# Patient Record
Sex: FEMALE | Race: OTHER | Employment: UNEMPLOYED | URBAN - NONMETROPOLITAN AREA
[De-identification: names, ages, dates, MRNs, and addresses within clinical notes are randomized per-mention and may not be internally consistent; named-entity substitution may affect disease eponyms.]

---

## 2017-03-27 ENCOUNTER — OFFICE VISIT (OUTPATIENT)
Dept: FAMILY MEDICINE CLINIC | Age: 37
End: 2017-03-27

## 2017-03-27 VITALS
WEIGHT: 173 LBS | SYSTOLIC BLOOD PRESSURE: 137 MMHG | DIASTOLIC BLOOD PRESSURE: 92 MMHG | HEIGHT: 64 IN | BODY MASS INDEX: 29.53 KG/M2 | HEART RATE: 73 BPM | TEMPERATURE: 98.6 F

## 2017-03-27 DIAGNOSIS — N64.4 BREAST PAIN IN FEMALE: ICD-10-CM

## 2017-03-27 DIAGNOSIS — Z13.9 SCREENING: Primary | ICD-10-CM

## 2017-03-27 LAB — GLUCOSE POC: NORMAL MG/DL

## 2017-03-27 RX ORDER — IBUPROFEN 600 MG/1
600 TABLET ORAL
Qty: 60 TAB | Refills: 1 | Status: SHIPPED | OUTPATIENT
Start: 2017-03-27

## 2017-03-27 NOTE — PROGRESS NOTES
Coordination of Care  1. Have you been to the ER, urgent care clinic since your last visit? Hospitalized since your last visit? No    2. Have you seen or consulted any other health care providers outside of the [address] [place] [address] since your last visit? Include any pap smears or colon screening.  No    Medications  Medication Reconciliation Performed: no  Patient does not need refills     Learning Assessment Complete? yes  Results for orders placed or performed in visit on 03/27/17   AMB POC GLUCOSE BLOOD, BY GLUCOSE MONITORING DEVICE   Result Value Ref Range    Glucose POC 85 fasting mg/dL

## 2017-03-27 NOTE — MR AVS SNAPSHOT
Visit Information [facility] [place] Personal Médico Departamento Teléfono del Dep. Número de visita 3/27/2017 10:30 AM [facility] [phone] Follow-up Instructions Return in about 2 months (around 5/27/2017), or if symptoms worsen or fail to improve. Upcoming Health Maintenance Date Due DTaP/Tdap/Td series (1 - Tdap) 2/24/1999 PAP AKA CERVICAL CYTOLOGY 2/24/1999 INFLUENZA AGE 9 TO ADULT 8/1/2016 Alergias  Review Complete El: 3/27/2017 Por: Kathleen Fulling A partir del:  3/27/2017 No Known Allergies Vacunas actuales Maki Helene No hay ninguna vacuna archivada. No revisadas esta visita You Were Diagnosed With   
  
 Charmayne Decent Screening    -  Primary ICD-10-CM: Z13.9 ICD-9-CM: V82.9 Breast pain in female     ICD-10-CM: N64.4 ICD-9-CM: 611.71 Partes vitales PS Pulso Temperatura [place] ( percentil de crecimiento) Peso (percentil de crecimiento) LMP (última deepti) (!) 137/92 (BP 1 Location: Left arm, BP Patient Position: Sitting) 73 98.6 °F (37 °C) (Oral) 5' 4.25\" (1.632 m) 173 lb (78.5 kg) 03/17/2017 BMI [facility]) Estado obstétrico Estatus de tabaquísmo 29.46 kg/m2 Having regular periods Never Smoker Historial de signos vitales BMI and BSA Data Body Mass Index Body Surface Area  
 29.46 kg/m 2 1.89 m 2 [facility] Name Phone [facility]  [phone] Quinteros lista de medicamentos actualizada Lista actualizada el: 3/27/17 11:06 AM.  Shawn Olsen use quinteros lista de medicamentos más reciente. ibuprofen 600 mg tablet También conocido ronan:  MOTRIN Take 1 Tab by mouth every six (6) hours as needed for Pain. Sadorus 1 pastilla cada 6 horas si necissita por quinteros dolor Recetas Enviado a la Hall Refills  
 ibuprofen (MOTRIN) 600 mg tablet 1 Sig: Take 1 Tab by mouth every six (6) hours as needed for Pain. [place] 1 pastilla cada 6 horas si necissita por diaz dolor Class: Normal  
 [id number] [address] [place] [address] #: [phone] Route: Oral  
  
Hicimos lo siguiente AMB POC GLUCOSE BLOOD, BY GLUCOSE MONITORING DEVICE [63257 CPT(R)] Instrucciones de seguimiento Return in about 2 months (around 5/27/2017), or if symptoms worsen or fail to improve. Instrucciones para el Paciente Dolor de senos: Instrucciones de cuidado - [ Breast Pain: Care Instructions ] Instrucciones de cuidado La sensibilidad y el dolor de senos podría aparecer y desaparecer con los periodos menstruales (cíclico) o podría no seguir ningún patrón (no cíclico). El dolor de senos obdulia vez es causado por un problema de nuris grave. Podría ser necesario hacer pruebas para averiguar la causa. La atención de seguimiento es ne parte clave de diaz tratamiento y seguridad. Asegúrese de hacer y acudir a todas las citas, y llame a diaz médico si está teniendo problemas. También es ne buena idea saber los resultados de los exámenes y mantener ne lista de los medicamentos que ayush. Cómo puede cuidarse en el hogar? · Si diaz médico le recetó un medicamento, tómelo exactamente según las indicaciones. Llame a diaz médico si brayan estar teniendo problemas con diaz medicamento. · Para aliviar el dolor y la hinchazón, tome un analgésico (medicamento para el dolor) de venta jackeline, ronan acetaminofén (Tylenol), ibuprofeno (Advil, Motrin) o naproxeno (Aleve). Amber y siga todas las instrucciones de la [place]. · No tome dos o más analgésicos al [employer], a menos que el médico se lo haya indicado. Muchos analgésicos contienen acetaminofén, es decir, Tylenol. El exceso de acetaminofén (Tylenol) puede ser dañino. · Use un sostén que le dé buen soporte, ronan los que se usan para hacer deporte o correr. · Reduzca la cantidad de grasa en diaz dieta. Si necesita ayuda para planificar comidas saludables, consulte a un dietista. · Navi por lo menos 30 minutos de ejercicio la mayoría de los días de la [place]. Caminar es ne buena opción. Es posible que también quiera hacer otras actividades, ronan correr, nadar, [employer], o jugar al tenis o practicar otros deportes de equipo. · Mantenga un patrón de sueño saludable. Adelaida Rota a dormir a la misma hora todas las noches y levántese a la misma hora cada día. Cuándo debe pedir ayuda? Llame a diaz médico ahora mismo o busque atención médica inmediata si: · Tiene cambios nuevos en un seno, ronan: ¨ Un bulto o engrosamiento en el seno o la axila. ¨ Un cambio en el tamaño o la forma del seno. ¨ Cambios en la piel, ronan hoyuelos o arrugas. ¨ Secreción de [employer]. ¨ Un cambio en el color o la textura de la piel del seno o la suhas oscura alrededor del pezón (areola). ¨ Un cambio en la forma del pezón (podría parecer ronan si estuviera hundido en el seno). · Tiene síntomas de infección en el seno, tales ronan: ¨ Aumento del dolor, la hinchazón, el enrojecimiento o la temperatura alrededor del seno. ¨ Vetas rojizas que se extienden desde el seno. ¨ Pus que supura de un seno. Guadlupe Forester. Preste especial atención a los cambios en diaz nuris y asegúrese de comunicarse con diaz médico si: 
· Diaz dolor de los senos no disminuye después de 1 semana. · Tiene un bulto o engrosamiento en el seno o la axila. Dónde puede encontrar más información en inglés? Adelaida Holder a [email or web address] Elas Altman [id number] en la búsqueda para aprender más acerca de \"Dolor de senos: Instrucciones de cuidado - [ Breast Pain: Care Instructions ]. \" 
Revisado: [address] [place], 2016 Versión del contenido: 11.1 © 6915-8823 [employer]orporated.  Las instrucciones de cuidado fueron adaptadas bajo licencia por Good Help Connections (which disclaims liability or warranty for this information). Si usted tiene [place] afección médica o sobre estas instrucciones, siempre pregunte a diaz profesional de nuris. [facility], Incorporated niega toda garantía o responsabilidad por diaz uso de esta información. Introducing [facility]! [facility] introduce portal paciente MyChart . Ahora se puede acceder a partes de diaz expediente médico, enviar por correo electrónico la oficina de diaz médico y solicitar renovaciones de medicamentos en línea. En diaz navegador de Internet , Dale Nelson a [email or web address] Shantell clic en el usuario por Theadore Ngozi? Clair Salmeron clic aquí en la sesión Elmaurizioa Мария. Verá la página de registro [place]. Ingrese diaz código de [employer] kolby y ronan aparece a continuación. Usted no tendrá que [employer] código después de servando completado el proceso de registro . Si usted no se inscribe antes de la fecha de caducidad , debe solicitar un nuevo código. · MyChart Código de acceso : [id number] Expires: 6/25/2017 11:06 AM 
 
Ingresa los últimos cuatro dígitos de diaz Número de Seguro Social ( xxxx ) y fecha de nacimiento ( dd / mm / aaaa ) ronan se indica y shantell clic en Enviar. Christel será llevado a la siguiente página de registro . Crear un ID MyChart . Esta será diaz ID de inicio de sesión de MyChart y no puede ser [place] , por lo que pensar en ne que es Valentine Hansen y fácil de recordar . Crear ne contraseña MyChart . ted puede cambiar diaz contraseña en cualquier momento . Ingrese diaz Password Reset de preguntas y Abdi . [place] se puede utilizar en un momento posterior si usted olvida diaz contraseña. Introduzca diaz dirección de correo electrónico . Thurl Wayne recibirá ne notificación por correo electrónico cuando la nueva información está disponible en MyChart . [place] don en Registrarse.  Camillo Smiles frankie y descargar porciones de diaz expediente Radha don en el enlace de descarga del menú Resumen para descargar ne copia portátil de diaz información médica . Si tiene [employer] , por favor visite la sección de preguntas frecuentes del sitio web MyChart . Recuerde, MyChart NO es que se utilizará para las necesidades urgentes. Para emergencias médicas , llame al 911 . Ahora disponible en diaz iPhone y Android ! Por favor proporcione mitchell resumen de la documentación de cuidado a diaz próximo proveedor. Your primary care clinician is listed as Phys Other. If you have any questions after today's visit, please call [phone].

## 2017-03-27 NOTE — PATIENT INSTRUCTIONS
Dolor de senos: Instrucciones de cuidado - [ Breast Pain: Care Instructions ]  Instrucciones de cuidado  La sensibilidad y el dolor de senos podría aparecer y desaparecer con los periodos menstruales (cíclico) o podría no seguir ningún patrón (no cíclico). El dolor de senos obdulia vez es causado por un problema de nuris grave. Podría ser necesario hacer pruebas para averiguar la causa. La atención de seguimiento es ne parte clave de diaz tratamiento y seguridad. Asegúrese de hacer y acudir a todas las citas, y llame a diaz médico si está teniendo problemas. También es ne buena idea saber los resultados de los exámenes y mantener ne lista de los medicamentos que ayush. ¿Cómo puede cuidarse en el hogar? · Si diaz médico le recetó un medicamento, tómelo exactamente según las indicaciones. Llame a diaz médico si brayan estar teniendo problemas con diaz medicamento. · Para aliviar el dolor y la hinchazón, tome un analgésico (medicamento para el dolor) de venta jackeline, ronan acetaminofén (Tylenol), ibuprofeno (Advil, Motrin) o naproxeno (Aleve). Amber y siga todas las instrucciones de la [place]. · No tome dos o más analgésicos al [employer], a menos que el médico se lo haya indicado. Muchos analgésicos contienen acetaminofén, es decir, Tylenol. El exceso de acetaminofén (Tylenol) puede ser dañino. · Use un sostén que le dé buen soporte, ronan los que se usan para hacer deporte o correr. · Reduzca la cantidad de grasa en diaz dieta. Si necesita ayuda para planificar comidas saludables, consulte a un dietista. · Navi por lo menos 30 minutos de ejercicio la mayoría de los días de la [place]. Caminar es ne buena opción. Es posible que también quiera hacer otras actividades, ronan correr, nadar, [employer], o jugar al tenis o practicar otros deportes de equipo. · Mantenga un patrón de sueño saludable. Penne Delcambre a dormir a la misma hora todas las noches y levántese a la misma hora cada día. ¿Cuándo debe pedir ayuda?   Llame a quinteros médico ahora mismo o busque atención médica inmediata si:  · Tiene cambios nuevos en un seno, ronan:  ¨ Un bulto o engrosamiento en el seno o la axila. ¨ Un cambio en el tamaño o la forma del seno. ¨ Cambios en la piel, ronan hoyuelos o arrugas. ¨ Secreción de [employer]. ¨ Un cambio en el color o la textura de la piel del seno o la suhas oscura alrededor del pezón (areola). ¨ Un cambio en la forma del pezón (podría parecer ronan si estuviera hundido en el seno). · Tiene síntomas de infección en el seno, tales ronan:  ¨ Aumento del dolor, la hinchazón, el enrojecimiento o la temperatura alrededor del seno. ¨ Vetas rojizas que se extienden desde el seno. ¨ Pus que supura de un seno. Aftab Knox. Preste especial atención a los cambios en quinteros nuris y asegúrese de comunicarse con quinteros médico si:  · Quinteros dolor de los senos no disminuye después de 1 semana. · Tiene un bulto o engrosamiento en el seno o la axila. ¿Dónde puede encontrar más información en inglés? Jose Alfredo Sultana a [email or web address] Melani Hayley [id number] en la búsqueda para aprender más acerca de \"Dolor de senos: Instrucciones de cuidado - [ Breast Pain: Care Instructions ]. \"  Revisado: [address] [place], 2016  Versión del contenido: 11.1  © 9583-7879 [employer], Incorporated. Las instrucciones de cuidado fueron adaptadas bajo licencia por Good Help Connections (which disclaims liability or warranty for this information). Si usted tiene [place] afección médica o sobre estas instrucciones, siempre pregunte a quinteros profesional de nuris. [facility], Incorporated niega toda garantía o responsabilidad por quinteros uso de esta información.

## 2017-03-28 NOTE — PROGRESS NOTES
Assessment/Plan:    Radha Wayne was seen today for breast pain and other. Diagnoses and all orders for this visit:    Screening  -     AMB POC GLUCOSE BLOOD, BY GLUCOSE MONITORING DEVICE    Breast pain in female  -     ibuprofen (MOTRIN) 600 mg tablet; Take 1 Tab by mouth every six (6) hours as needed for Pain. [place] 1 pastilla cada 6 horas si necissita por diaz dolor    Suspect fibrocystic breast changes which coincided exactly with the removal of her implanon. Asked her to try ibu pre-menstrual period for the next 2 months and return in 2 months. Will get mammogram if sxs are not improved or have changed. She will be due for mammo within the year as she will turn 38    Follow-up Disposition:  Return in about 2 months (around 5/27/2017), or if symptoms worsen or fail to improve. EMELYN Aiken expressed understanding of this plan. An AVS was printed and given to the patient.      ----------------------------------------------------------------------    Chief Complaint   Patient presents with    Breast pain     pt c/o a stabbing pain on both breasts but worse on left side x2 months, family hx of diabetes and cancer    Other     pt requesting labs for cholesterol and A1C       History of Present Illness:  Pt here for new problem with occasional breast pain in left breast. She notes that she first noticed the pain 2 months ago. The timing coincided with the removal of her Implanon for birth control. Since then, she is using condoms for birth control- she is not desiring a future pregnancy at this time. The breast pain seemed to be worse before her period. She has had this before and had a mammogram about 4-5 years ago, which was reported to her as normal findings. She is not aware if there were cystic changes in her breasts at that time. While on the implanon plan for birth control, she did not have this problem.  She thinks that at times before her period that she can feel a \"hard part of her breast\" but currently she can not feel any thing. The results of her mammogram are not available at this time      Past Medical History:   Diagnosis Date    Pap smear for cervical cancer screening 08/ 2012    normal       Current Outpatient Prescriptions   Medication Sig Dispense Refill    ibuprofen (MOTRIN) 600 mg tablet Take 1 Tab by mouth every six (6) hours as needed for Pain. [place] 1 pastilla cada 6 horas si necissita por diaz dolor 60 Tab 1       No Known Allergies    Social History   Substance Use Topics    Smoking status: Never Smoker    Smokeless tobacco: None    Alcohol use Yes      Comment: social       No family history on file.     Physical Exam:     Visit Vitals    BP (!) 137/92 (BP 1 Location: Left arm, BP Patient Position: Sitting)    Pulse 73    Temp 98.6 °F (37 °C) (Oral)    Ht 5' 4.25\" (1.632 m)    Wt 173 lb (78.5 kg)    LMP 03/17/2017    BMI 29.46 kg/m2     gen looks well, pleasant  A&Ox3  WDWN NAD  Respirations normal and non labored  Breast exam- symmetrical aracelis, no masses, nipple retraction, dimpling, skin color changes or masses felt

## 2017-05-09 ENCOUNTER — HOSPITAL ENCOUNTER (OUTPATIENT)
Dept: CT IMAGING | Age: 37
Discharge: HOME | End: 2017-05-09
Attending: FAMILY MEDICINE
Payer: SELF-PAY

## 2017-05-09 ENCOUNTER — OFFICE VISIT (OUTPATIENT)
Dept: FAMILY MEDICINE CLINIC | Age: 37
End: 2017-05-09

## 2017-05-09 VITALS
BODY MASS INDEX: 28.85 KG/M2 | OXYGEN SATURATION: 98 % | DIASTOLIC BLOOD PRESSURE: 84 MMHG | HEIGHT: 64 IN | TEMPERATURE: 98 F | SYSTOLIC BLOOD PRESSURE: 117 MMHG | HEART RATE: 77 BPM | WEIGHT: 169 LBS | RESPIRATION RATE: 18 BRPM

## 2017-05-09 DIAGNOSIS — R10.31 ABDOMINAL PAIN, RLQ: Primary | ICD-10-CM

## 2017-05-09 DIAGNOSIS — R10.9 ABDOMINAL PAIN, UNSPECIFIED LOCATION: ICD-10-CM

## 2017-05-09 LAB
BILIRUB UR QL STRIP: NEGATIVE
GLUCOSE UR-MCNC: NEGATIVE MG/DL
HCG URINE, QL. (POC): NEGATIVE
KETONES P FAST UR STRIP-MCNC: NEGATIVE MG/DL
PH UR STRIP: 5.5 [PH] (ref 4.6–8)
PROT UR QL STRIP: NEGATIVE MG/DL
SP GR UR STRIP: 1.01 (ref 1–1.03)
UA UROBILINOGEN AMB POC: NORMAL (ref 0.2–1)
URINALYSIS CLARITY POC: NORMAL
URINALYSIS COLOR POC: YELLOW
URINE BLOOD POC: NORMAL
URINE LEUKOCYTES POC: NEGATIVE
URINE NITRITES POC: NEGATIVE
VALID INTERNAL CONTROL?: YES

## 2017-05-09 PROCEDURE — 74011636320 HC RX REV CODE- 636/320: Performed by: RADIOLOGY

## 2017-05-09 PROCEDURE — 74177 CT ABD & PELVIS W/CONTRAST: CPT

## 2017-05-09 RX ORDER — CIPROFLOXACIN 500 MG/1
TABLET ORAL
Refills: 0 | COMMUNITY
Start: 2017-04-21 | End: 2017-05-09 | Stop reason: ALTCHOICE

## 2017-05-09 RX ADMIN — IOPAMIDOL 100 ML: 755 INJECTION, SOLUTION INTRAVENOUS at 15:00

## 2017-05-09 NOTE — PROGRESS NOTES
Visit Vitals    /84 (BP 1 Location: Left arm, BP Patient Position: Sitting)    Pulse 77    Temp 98 °F (36.7 °C) (Oral)    Resp 18    Ht 5' 4\" (1.626 m)    Wt 169 lb (76.7 kg)    LMP 04/17/2017    SpO2 98%    BMI 29.01 kg/m2     Chief Complaint   Patient presents with    Abdominal Pain     x 1 month, when lifing 10-15lbs painful    Back Pain

## 2017-05-09 NOTE — PATIENT INSTRUCTIONS
Dolor abdominal: Instrucciones de cuidado - [ Abdominal Pain: Care Instructions ]  Instrucciones de cuidado    El dolor abdominal tiene muchas causas posibles. Algunas de ellas no son graves y mejoran por sí solas en unos días. Otras requieren Patricia Savage y [place]. Si diaz dolor continúa o [place], necesitará ne nueva revisión y [place] pruebas para determinar qué pasa. Es posible que necesite cirugía para corregir el problema. No ignore nuevos síntomas, ronan fiebre, náuseas y [place], [facility], dolor que [place] o [place]. Podrían ser señales de un problema más grave. Diaz médico puede haberle recomendado ne consulta de Vignesh [employer] Alethea las 8 o 12 horas siguientes. Si no se siente mejor, es posible que requiera Patricia Savage o [place]. El médico lo juarez revisado minuciosamente, ashely puede servando problemas más tarde. Si nota algún problema o síntomas nuevos, busque tratamiento médico inmediatamente. La atención de seguimiento es ne parte clave de diaz tratamiento y seguridad. Asegúrese de hacer y acudir a todas las citas, y llame a diaz médico si está teniendo problemas. También es ne buena idea saber los resultados de los exámenes y mantener ne lista de los medicamentos que ayush. ¿Cómo puede cuidarse en el hogar? · Descanse hasta que se sienta mejor. · Para prevenir la deshidratación, marcus abundantes líquidos, suficientes para que diaz orina sea de color amarillo pankaj o transparente ronan el agua. Elija beber agua y otros líquidos pasquale sin cafeína hasta que se sienta mejor. Si tiene [facility], del corazón o del hígado y tiene que Kody's líquidos, hable con diaz médico antes de aumentar diaz consumo. · Si tiene [employer], coma alimentos suaves, ronan arroz, pan leti seco o galletas saladas, bananas (plátanos) y puré de Synchari. Trate de comer varias comidas pequeñas al día en lugar de dos o clay grandes.   · Espere hasta 50 horas después de que [employer] síntomas hayan desaparecido antes de comer alimentos condimentados, alcohol y bebidas que contengan cafeína. · No consuma alimentos ricos en grasa. · Evite medicamentos antiinflamatorios ronan aspirina, ibuprofeno (Advil, Motrin) y naproxeno (Aleve). Pueden causar [employer]. Dígale a quinteros médico si está tomando aspirina diariamente debido a otro problema de nuris. ¿Cuándo debe pedir ayuda? Llame al 911 en cualquier momento que considere que necesita atención de emergencia. Por ejemplo, llame si:  · Se desmayó (perdió el conocimiento). · Las heces son de color rojizo o muy sanguinolentas (con ara). · Vomita ara o algo parecido a granos de café molido. · Tiene dolor abdominal nuevo e intenso. Llame a quinteros médico ahora mismo o busque atención médica inmediata si:  · Quinteros dolor empeora, sobre todo si se concentra en ne merrick parte del vientre. · Vuelve a tener fiebre o tiene fiebre más katia. · Martha heces son negruzcas y parecidas al alquitrán o tienen rastros de [place]. · Tiene sangrado vaginal inesperado. · Tiene síntomas de ne infección del tracto urinario. Estos podrían incluir:  ¨ Dolor al [place]. ¨ Orinar con más frecuencia que lo habitual.  ¨ Ara en la [place]. · Siente mareos o aturdimiento, o que está a punto de [place]. Preste especial atención a los cambios en quinteros nuris y asegúrese de comunicarse con quinteros médico si:  · No está mejorando después de 1 día (24 horas). ¿Dónde puede encontrar más información en inglés? Emily Durant a [email or web address] Magalys Godoy [id number] en la búsqueda para aprender más acerca de \"Dolor abdominal: Instrucciones de cuidado - [ Abdominal Pain: Care Instructions ]. \"  Revisado: [address] [place], 2016  Versión del contenido: 11.2  © 8532-1343 [employer], [employer]. Las instrucciones de cuidado fueron adaptadas bajo licencia por Good Help Connections (which disclaims liability or warranty for this information).  Si usted tiene Allison Sterling afección médica o sobre estas instrucciones, siempre pregunte a diaz profesional de nuris. [facility], Incorporated niega toda garantía o responsabilidad por diaz uso de esta información.

## 2017-05-09 NOTE — MR AVS SNAPSHOT
Visit Information Kassidy Zavaleta Personal Médico Departamento Teléfono del Dep. Número de visita 5/9/2017 10:15 AM Jannet Kat, [address] [phone] Your Appointments 6/9/2017  8:40 AM  
New Patient with Toy Ackerman MD  
Donovan Shipman [facility]) Appt Note: EST care [address] [employer] [address] [facility]  
[phone]  
  
   
 [address] [facility] [id number] Upcoming Health Maintenance Date Due DTaP/Tdap/Td series (1 - Tdap) 2/24/1999 PAP AKA CERVICAL CYTOLOGY 2/24/1999 INFLUENZA AGE 9 TO ADULT 8/1/2017 Alergias  Review Complete El: 5/9/2017 Por: Shazia Shah LPN A partir del:  5/9/2017 No Known Allergies Vacunas actuales Alie Magic No hay ninguna vacuna archivada. No revisadas esta visita You Were Diagnosed With   
  
 Luann Taylor Abdominal pain, unspecified location    -  Primary ICD-10-CM: R10.9 ICD-9-CM: 789.00 Partes vitales PS Pulso Temperatura Resp [place] ( percentil de crecimiento) Peso (percentil de crecimiento) 117/84 (BP 1 Location: Left arm, BP Patient Position: Sitting) 77 98 °F (36.7 °C) (Oral) 18 5' 4\" (1.626 m) 169 lb (76.7 kg) LMP (última edepti) SpO2 BMI (IMC) Estado obstétrico Estatus de tabaquísmo 04/17/2017 98% 29.01 kg/m2 Having regular periods Never Smoker Historial de signos vitales BMI and BSA Data Body Mass Index Body Surface Area  
 29.01 kg/m 2 1.86 m 2 [facility] Name Phone [address] [facility] [phone] Quinteros lista de medicamentos actualizada Lista actualizada el: 5/9/17 10:43 AM.  Sandraseda Touree use quinteros lista de medicamentos más reciente. ciprofloxacin HCl 500 mg tablet También conocido ronan:  CIPRO  
  
 ibuprofen 600 mg tablet También conocido ronan:  MOTRIN Take 1 Tab by mouth every six (6) hours as needed for Pain. [place] 1 pastilla cada 6 horas si necissita por diaz dolor Hicimos lo siguiente AMB POC URINALYSIS DIP STICK AUTO W/O MICRO [38516 CPT(R)] AMB POC URINE PREGNANCY TEST, VISUAL COLOR COMPARISON [17488 CPT(R)] CBC WITH AUTOMATED DIFF [29248 CPT(R)] METABOLIC PANEL, COMPREHENSIVE [75044 CPT(R)] Por hacer 05/09/2017 Imaging:  CT ABD PELV W WO CONT Instrucciones para el Paciente Dolor abdominal: Instrucciones de cuidado - [ Abdominal Pain: Care Instructions ] Instrucciones de cuidado El dolor abdominal tiene muchas causas posibles. Algunas de ellas no son graves y mejoran por sí solas en unos días. Otras requieren [place] y [place]. Si diaz dolor continúa o [place], necesitará ne nueva revisión y [place] pruebas para determinar qué pasa. Es posible que necesite cirugía para corregir el problema. No ignore nuevos síntomas, ronan fiebre, náuseas y [place], [address] [facility], dolor que [place] o [place]. Podrían ser señales de un problema más grave. Diaz médico puede haberle recomendado ne consulta de Vignesh & Alethea las 8 o 12 horas siguientes. Si no se siente mejor, es posible que requiera Patricia [place] o [place]. El médico lo juarez revisado minuciosamente, ashely puede servando problemas más tarde. Si nota algún problema o síntomas nuevos, busque tratamiento médico inmediatamente. La atención de seguimiento es ne parte clave de diaz tratamiento y seguridad. Asegúrese de hacer y acudir a todas las citas, y llame a diaz médico si está teniendo problemas. También es ne buena idea saber los resultados de los exámenes y mantener ne lista de los medicamentos que ayush. Cómo puede cuidarse en el hogar? · Descanse hasta que se sienta mejor.  
· Para prevenir la deshidratación, marcus abundantes líquidos, suficientes para que diaz orina sea de color amarillo pankaj o transparente ronan el agua. Elija beber agua y otros líquidos pasquale sin cafeína hasta que se sienta mejor. Si tiene [facility], del corazón o del hígado y tiene que Kody's líquidos, hable con diaz médico antes de aumentar diaz consumo. · Si tiene [employer], coma alimentos suaves, ronan arroz, pan leti seco o galletas saladas, bananas (plátanos) y puré de Synchari. Trate de comer varias comidas pequeñas al día en lugar de dos o clay grandes. · Espere hasta 48 horas después de que todos los síntomas hayan desaparecido antes de comer alimentos condimentados, alcohol y bebidas que contengan cafeína. · No consuma alimentos ricos en grasa. · Evite medicamentos antiinflamatorios ronan aspirina, ibuprofeno (Advil, Motrin) y naproxeno (Aleve). Pueden causar [employer]. Dígale a diaz médico si está tomando aspirina diariamente debido a otro problema de nuris. Cuándo debe pedir ayuda? Llame al 911 en cualquier momento que considere que necesita atención de emergencia. Por ejemplo, llame si: 
· Se desmayó (perdió el conocimiento). · Las heces son de color rojizo o muy sanguinolentas (con ara). · Vomita ara o algo parecido a granos de café molido. · Tiene dolor abdominal nuevo e intenso. Llame a diaz médico ahora mismo o busque atención médica inmediata si: 
· Diaz dolor empeora, sobre todo si se concentra en ne merrick parte del vientre. · Vuelve a tener fiebre o tiene fiebre más katia. · Martha heces son negruzcas y parecidas al alquitrán o tienen rastros de [place]. · Tiene sangrado vaginal inesperado. · Tiene síntomas de ne infección del tracto urinario. Estos podrían incluir: ¨ Dolor al [place]. ¨ Orinar con más frecuencia que lo habitual. 
¨ Ara en la [place]. · Siente mareos o aturdimiento, o que está a punto de [place].  
Preste especial atención a los cambios en diaz nuris y asegúrese de comunicarse con diaz médico si: 
 · No está mejorando después de 1 día (24 horas). Dónde puede encontrar más información en inglés? Ebony Penaloza a http://micaela-michelle.info/. Lisa Fregoso V633 en la búsqueda para aprender más acerca de \"Dolor abdominal: Instrucciones de cuidado - [ Abdominal Pain: Care Instructions ]. \" 
Revisado: 27 andre, 2016 Versión del contenido: 11.2 © 8394-6060 Healthwise, Incorporated. Las instrucciones de cuidado fueron adaptadas bajo licencia por [employer] (which disclaims liability or warranty for this information). Si usted tiene [place] afección médica o sobre estas instrucciones, siempre pregunte a diaz profesional de nuris. Healthwise, Incorporated niega toda garantía o responsabilidad por diaz uso de esta información. Introducing [facility]! [facility] introduce portal paciente MyChart . Ahora se puede acceder a partes de diaz expediente médico, enviar por correo electrónico la oficina de diaz médico y solicitar renovaciones de medicamentos en línea. En diaz navegador de Internet , Femi Rivera a [email or web address] Marinehart Shantell florencia en el usuario por Shaun Clay? Ange Escoto clnova aquí en la sesión [facility]. Verá la página de registro [place]. Ingrese diaz código de [employer] kolby y ronan aparece a continuación. Usted no tendrá que [employer] código después de servando completado el proceso de registro . Si usted no se inscribe antes de la fecha de caducidad , debe solicitar un nuevo código. · MyChart Código de acceso : [id number] Expires: 6/25/2017 11:06 AM 
 
Ingresa los últimos cuatro dígitos de diaz Número de Seguro Social ( xxxx ) y fecha de nacimiento ( dd / mm / aaaa ) ronan se indica y shantell clic en Enviar. Usted será llevado a la siguiente página de registro . Crear un ID MyChart . Esta será diaz ID de inicio de sesión de MyChart y no puede ser [place] , por lo que pensar en ne que es Richerd Hammers y fácil de recordar . Crear ne contraseña MyChart . Usted puede cambiar diaz contraseña en cualquier momento . Ingrese diaz Password Reset de preguntas y Abdi . [facility] se puede utilizar en un momento posterior si usted olvida diaz contraseña. Introduzca diaz dirección de correo electrónico . Shanice Proud recibirá ne notificación por correo electrónico cuando la nueva información está disponible en MyChart . Shawn Pardo clic en Registrarse. Alejandra Aminah frankie y descargar porciones de diaz expediente médico. 
Navi clic en el enlace de descarga del menú Resumen para descargar ne copia portátil de diaz información médica . Si tiene [employer] , por favor visite la sección de preguntas frecuentes del sitio web MyChart . Recuerde, MyChart NO es que se utilizará para las necesidades urgentes. Para emergencias médicas , llame al 911 . Ahora disponible en diaz iPhone y Android ! Por favor proporcione mitchell resumen de la documentación de cuidado a diaz próximo proveedor. Your primary care clinician is listed as Phys Other. If you have any questions after today's visit, please call [phone].

## 2017-05-09 NOTE — PROGRESS NOTES
Subjective:      Yelena Alexandra is a 42 y.o. female who presents for establishment visit. Patient is a new patient to [facility]. Issues discussed today    Abdominal pain - Reports that it has been going on for one month. Reports that it is on her right abdomen, around her umbilicus and radiates to her back and her right lower quadrant. Denies any nausea, vomiting, fevers or chills   Reports that she was started on Keflex for urinary infection at [facility]. She reports that she completed the course of antibiotic however her pain still persists. Denies any fevers, weight loss, bladder or bowel incontinence or sacral area numbness. She reports regular menstrual cycles. ROS:  Positive if bold     General/Constitutional:   No headache, fever, fatigue, weight loss or weight gain       Neck:   No swelling, masses, stiffness, pain, or limited movement     Cardiac:    No chest pain      Respiratory:   No cough or shortness of breath     GI:   No nausea/vomiting, diarrhea, abdominal pain, bloody or dark stools       :   No dysuria or  hematuria    Neurological:   No loss of consciousness, dizziness, seizures, dysarthria, cognitive changes, memory changes,  problems with balance, or unilateral weakness     Skin: No rash     PMHx:  Past Medical History:   Diagnosis Date    Pap smear for cervical cancer screening 08/ 2012    normal       Meds:   Current Outpatient Prescriptions   Medication Sig Dispense Refill    ibuprofen (MOTRIN) 600 mg tablet Take 1 Tab by mouth every six (6) hours as needed for Pain.  [place] 1 pastilla cada 6 horas si necissita por diaz dolor 60 Tab 1     Facility-Administered Medications Ordered in Other Visits   Medication Dose Route Frequency Provider Last Rate Last Dose    iopamidol (ISOVUE-370) 76 % injection 100 mL  100 mL IntraVENous RAD ONCE Margie Clements MD           Allergies:   No Known Allergies    Smoker:  History   Smoking Status    Never Smoker   Smokeless Tobacco    Not on file       ETOH:   History   Alcohol Use    Yes     Comment: social       FH: History reviewed. No pertinent family history. Objective:     Visit Vitals    /84 (BP 1 Location: Left arm, BP Patient Position: Sitting)    Pulse 77    Temp 98 °F (36.7 °C) (Oral)    Resp 18    Ht 5' 4\" (1.626 m)    Wt 169 lb (76.7 kg)    LMP 04/17/2017    SpO2 98%    BMI 29.01 kg/m2       Physical Examination:   GEN: No apparent distress. Alert and oriented and responds to all questions appropriately. EYES:  Conjunctiva clear; pupils round and reactive to light; extraocular movements are intact. NOSE: Turbinates are within normal limits. No drainage  OROPHYARYNX: No oral lesions or exudates. NECK:  Supple; no masses; thyroid normal           LUNGS: Respirations unlabored; clear to auscultation bilaterally  CARDIOVASCULAR: Regular, rate, and rhythm without murmurs, gallops or rubs   ABDOMEN:  TTP over right lower quadrant. Soft, nondistended; normoactive bowel sounds;   NEUROLOGIC:  No focal neurologic deficits. Strength and sensation grossly intact. Coordination and gait grossly intact. EXT: Well perfused. No edema. SKIN: No obvious rashes. Assessment:       ICD-10-CM ICD-9-CM    1. Abdominal pain, RLQ R10.31 789.03 AMB POC URINALYSIS DIP STICK AUTO W/O MICRO      AMB POC URINE PREGNANCY TEST, VISUAL COLOR COMPARISON      CBC WITH AUTOMATED DIFF      METABOLIC PANEL, COMPREHENSIVE      LIPASE      CULTURE, URINE      DISCONTINUED: ciprofloxacin HCl (CIPRO) 500 mg tablet      CANCELED: CT ABD PELV W WO CONT           Plan:   UPT - negative     Presentation concerning for appendicitis given location of pain. Could also be secondary to UTI, ovarian cyst or  renal stone   Follow up labs including UA, CBC and CMP as above   Follow up CT scan abdomen/pelvis, patient scheduled for this afternoon. ED precautions given   Patient in agreement with the plan.         Patient discussed with  Jacoby Figueroa, attending physician.         Signed By:  Nancy Montoya MD    Family Medicine Resident

## 2017-05-10 LAB
ALBUMIN SERPL-MCNC: 4.6 G/DL (ref 3.5–5.5)
ALBUMIN/GLOB SERPL: 1.5 {RATIO} (ref 1.2–2.2)
ALP SERPL-CCNC: 82 IU/L (ref 39–117)
ALT SERPL-CCNC: 17 IU/L (ref 0–32)
AST SERPL-CCNC: 19 IU/L (ref 0–40)
BASOPHILS # BLD AUTO: 0 X10E3/UL (ref 0–0.2)
BASOPHILS NFR BLD AUTO: 1 %
BILIRUB SERPL-MCNC: 0.3 MG/DL (ref 0–1.2)
BUN SERPL-MCNC: 8 MG/DL (ref 6–20)
BUN/CREAT SERPL: 18 (ref 9–23)
CALCIUM SERPL-MCNC: 9.9 MG/DL (ref 8.7–10.2)
CHLORIDE SERPL-SCNC: 101 MMOL/L (ref 96–106)
CO2 SERPL-SCNC: 21 MMOL/L (ref 18–29)
CREAT SERPL-MCNC: 0.44 MG/DL (ref 0.57–1)
EOSINOPHIL # BLD AUTO: 0.2 X10E3/UL (ref 0–0.4)
EOSINOPHIL NFR BLD AUTO: 2 %
ERYTHROCYTE [DISTWIDTH] IN BLOOD BY AUTOMATED COUNT: 13.1 % (ref 12.3–15.4)
GLOBULIN SER CALC-MCNC: 3.1 G/DL (ref 1.5–4.5)
GLUCOSE SERPL-MCNC: 88 MG/DL (ref 65–99)
HCT VFR BLD AUTO: 40.9 % (ref 34–46.6)
HGB BLD-MCNC: 13.7 G/DL (ref 11.1–15.9)
IMM GRANULOCYTES # BLD: 0 X10E3/UL (ref 0–0.1)
IMM GRANULOCYTES NFR BLD: 0 %
LYMPHOCYTES # BLD AUTO: 2.5 X10E3/UL (ref 0.7–3.1)
LYMPHOCYTES NFR BLD AUTO: 34 %
MCH RBC QN AUTO: 30.2 PG (ref 26.6–33)
MCHC RBC AUTO-ENTMCNC: 33.5 G/DL (ref 31.5–35.7)
MCV RBC AUTO: 90 FL (ref 79–97)
MONOCYTES # BLD AUTO: 0.3 X10E3/UL (ref 0.1–0.9)
MONOCYTES NFR BLD AUTO: 4 %
NEUTROPHILS # BLD AUTO: 4.3 X10E3/UL (ref 1.4–7)
NEUTROPHILS NFR BLD AUTO: 59 %
PLATELET # BLD AUTO: 373 X10E3/UL (ref 150–379)
POTASSIUM SERPL-SCNC: 4.9 MMOL/L (ref 3.5–5.2)
PROT SERPL-MCNC: 7.7 G/DL (ref 6–8.5)
RBC # BLD AUTO: 4.53 X10E6/UL (ref 3.77–5.28)
SODIUM SERPL-SCNC: 141 MMOL/L (ref 134–144)
WBC # BLD AUTO: 7.3 X10E3/UL (ref 3.4–10.8)

## 2017-05-11 LAB — BACTERIA UR CULT: ABNORMAL

## 2017-05-12 ENCOUNTER — TELEPHONE (OUTPATIENT)
Dept: FAMILY MEDICINE CLINIC | Age: 37
End: 2017-05-12

## 2017-05-12 DIAGNOSIS — N39.0 URINARY TRACT INFECTION WITHOUT HEMATURIA, SITE UNSPECIFIED: Primary | ICD-10-CM

## 2017-05-12 RX ORDER — AMOXICILLIN 875 MG/1
875 TABLET, FILM COATED ORAL 2 TIMES DAILY
Qty: 10 TAB | Refills: 0 | Status: SHIPPED | OUTPATIENT
Start: 2017-05-12 | End: 2017-05-17

## 2017-05-12 NOTE — TELEPHONE ENCOUNTER
Called patient. Identified x2. Notified her I sent in medicine for urinary tract infection to Pharmacy.      Toña Dobbins MD  5:24 PM

## 2017-06-09 ENCOUNTER — OFFICE VISIT (OUTPATIENT)
Dept: FAMILY MEDICINE CLINIC | Age: 37
End: 2017-06-09

## 2017-06-09 NOTE — PATIENT INSTRUCTIONS
A Healthy Lifestyle: Care Instructions  Your Care Instructions  A healthy lifestyle can help you feel good, stay at a healthy weight, and have plenty of energy for both work and play. A healthy lifestyle is something you can share with your whole family. A healthy lifestyle also can lower your risk for serious health problems, such as high blood pressure, heart disease, and diabetes. You can follow a few steps listed below to improve your health and the health of your family. Follow-up care is a key part of your treatment and safety. Be sure to make and go to all appointments, and call your doctor if you are having problems. Its also a good idea to know your test results and keep a list of the medicines you take. How can you care for yourself at home? · Do not eat too much sugar, fat, or fast foods. You can still have dessert and treats now and then. The goal is moderation. · Start small to improve your eating habits. Pay attention to portion sizes, drink less juice and soda pop, and eat more fruits and vegetables. ¨ Eat a healthy amount of food. A 3-ounce serving of meat, for example, is about the size of a deck of cards. Fill the rest of your plate with vegetables and whole grains. ¨ Limit the amount of soda and sports drinks you have every day. Drink more water when you are thirsty. ¨ Eat at least 5 servings of fruits and vegetables every day. It may seem like a lot, but it is not hard to reach this goal. A serving or helping is 1 piece of fruit, 1 cup of vegetables, or 2 cups of leafy, raw vegetables. Have an apple or some carrot sticks as an afternoon snack instead of a candy bar. Try to have fruits and/or vegetables at every meal.  · Make exercise part of your daily routine. You may want to start with simple activities, such as walking, bicycling, or slow swimming. Try to be active 30 to 60 minutes every day. You do not need to do all 30 to 60 minutes all at once.  For example, you can exercise 3 times a day for 10 or 20 minutes. Moderate exercise is safe for most people, but it is always a good idea to talk to your doctor before starting an exercise program.  · Keep moving. Lesley Morsey the lawn, work in the garden, or [employer]. Take the stairs instead of the elevator at work. · If you smoke, quit. People who smoke have an increased risk for heart attack, stroke, cancer, and other lung illnesses. Quitting is hard, but there are ways to boost your chance of quitting tobacco for good. ¨ Use nicotine gum, patches, or lozenges. ¨ Ask your doctor about stop-smoking programs and medicines. ¨ Keep trying. In addition to reducing your risk of diseases in the future, you will notice some benefits soon after you stop using tobacco. If you have shortness of breath or asthma symptoms, they will likely get better within a few weeks after you quit. · Limit how much alcohol you drink. Moderate amounts of alcohol (up to 2 drinks a day for men, 1 drink a day for women) are okay. But drinking too much can lead to liver problems, high blood pressure, and other health problems. Family health  If you have a family, there are many things you can do together to improve your health. · Eat meals together as a family as often as possible. · Eat healthy foods. This includes fruits, vegetables, lean meats and dairy, and whole grains. · Include your family in your fitness plan. Most people think of activities such as jogging or tennis as the way to fitness, but there are many ways you and your family can be more active. Anything that makes you breathe hard and gets your heart pumping is exercise. Here are some tips:  ¨ Walk to do errands or to take your child to school or the bus. ¨ Go for a family bike ride after dinner instead of watching TV. Where can you learn more? Go to http://micaela-michelle.info/. Enter R245 in the search box to learn more about \"A Healthy Lifestyle: Care Instructions. \"  Current as of: July 26, 2016  Content Version: 11.2  © 7454-4250 [employer]. Care instructions adapted under license by [employer] (which disclaims liability or warranty for this information). If you have questions about a medical condition or this instruction, always ask your healthcare professional. [address] any warranty or liability for your use of this information. Eating Healthy Foods: Care Instructions  Your Care Instructions  Eating healthy foods can help lower your risk for disease. Healthy food gives you energy and keeps your heart strong, your brain active, your muscles working, and your bones strong. A healthy diet includes a variety of foods from the basic food groups: grains, vegetables, fruits, milk and milk products, and meat and beans. Some people may eat more of their favorite foods from only one food group and, as a result, miss getting the nutrients they need. So, it is important to pay attention not only to what you eat but also to what you are missing from your diet. You can eat a healthy, balanced diet by making a few small changes. Follow-up care is a key part of your treatment and safety. Be sure to make and go to all appointments, and call your doctor if you are having problems. Its also a good idea to know your test results and keep a list of the medicines you take. How can you care for yourself at home? Look at what you eat  Keep a food diary for a week or two and record everything you eat or drink. Track the number of servings you eat from each food group. For a balanced diet every day, eat a variety of:  6 or more ounce-equivalents of grains, such as cereals, breads, crackers, rice, or pasta, every day. An ounce-equivalent is 1 slice of bread, 1 cup of ready-to-eat cereal, or ½ cup of cooked rice, cooked pasta, or cooked cereal.  2½ cups of vegetables, especially:  Dark-green vegetables such as broccoli and spinach.   Orange vegetables such as carrots and sweet potatoes. Dry beans (such as eldridge and kidney beans) and peas (such as lentils). 2 cups of fresh, frozen, or canned fruit. A small apple or 1 banana or orange equals 1 cup.  3 cups of nonfat or low-fat milk, yogurt, or other milk products. 5½ ounces of meat and beans, such as chicken, fish, lean meat, beans, nuts, and seeds. One egg, 1 tablespoon of peanut butter, ½ ounce nuts or seeds, or ¼ cup of cooked beans equals 1 ounce of meat. Learn how to read food labels for serving sizes and ingredients. Fast-food and convenience-food meals often contain few or no fruits or vegetables. Make sure you eat some fruits and vegetables to make the meal more nutritious. Look at your food diary. For each food group, add up what you have eaten and then divide the total by the number of days. This will give you an idea of how much you are eating from each food group. See if you can find some ways to change your diet to make it more healthy. Start small  Do not try to make dramatic changes to your diet all at once. You might feel that you are missing out on your favorite foods and then be more likely to fail. Start slowly, and gradually change your habits. Try some of the following:  Use whole wheat bread instead of white bread. Use nonfat or low-fat milk instead of whole milk. Eat brown rice instead of white rice, and eat whole wheat pasta instead of white-flour pasta. Try low-fat cheeses and low-fat yogurt. Add more fruits and vegetables to meals and have them for snacks. Add lettuce, tomato, cucumber, and onion to sandwiches. Add fruit to yogurt and cereal.  Enjoy food  You can still eat your favorite foods. You just may need to eat less of them. If your favorite foods are high in fat, salt, and sugar, limit how often you eat them, but do not cut them out entirely. Eat a wide variety of foods.   Make healthy choices when eating out  The type of restaurant you choose can help you make healthy choices. Even fast-food chains are now offering more low-fat or healthier choices on the menu. Choose smaller portions, or take half of your meal home. When eating out, try:  A veggie pizza with a whole wheat crust or grilled chicken (instead of sausage or pepperoni). Pasta with roasted vegetables, grilled chicken, or marinara sauce instead of cream sauce. A vegetable wrap or grilled chicken wrap. Broiled or poached food instead of fried or breaded items. Make healthy choices easy  Buy packaged, prewashed, ready-to-eat fresh vegetables and fruits, such as baby carrots, salad mixes, and chopped or shredded broccoli and cauliflower. Buy packaged, presliced fruits, such as melon or pineapple. Choose 100% fruit or vegetable juice instead of soda. Limit juice intake to 4 to 6 oz (½ to ¾ cup) a day. Blend low-fat yogurt, fruit juice, and canned or frozen fruit to make a smoothie for breakfast or a snack. Where can you learn more? Go to http://micaela-michelle.info/. Enter T756 in the search box to learn more about \"Eating Healthy Foods: Care Instructions. \"  Current as of: November 20, 2015  Content Version: 11.2  © 8055-9880 [employer]. Care instructions adapted under license by [employer] (which disclaims liability or warranty for this information). If you have questions about a medical condition or this instruction, always ask your healthcare professional. [place] [address] any warranty or liability for your use of this information.

## 2017-10-18 ENCOUNTER — HOSPITAL ENCOUNTER (OUTPATIENT)
Dept: LAB | Age: 37
Discharge: HOME | End: 2017-10-18
Payer: SELF-PAY

## 2017-10-18 ENCOUNTER — OFFICE VISIT (OUTPATIENT)
Dept: FAMILY MEDICINE CLINIC | Age: 37
End: 2017-10-18

## 2017-10-18 VITALS
WEIGHT: 180 LBS | RESPIRATION RATE: 16 BRPM | BODY MASS INDEX: 30.73 KG/M2 | SYSTOLIC BLOOD PRESSURE: 130 MMHG | DIASTOLIC BLOOD PRESSURE: 81 MMHG | HEIGHT: 64 IN | TEMPERATURE: 98.7 F | OXYGEN SATURATION: 99 % | HEART RATE: 82 BPM

## 2017-10-18 DIAGNOSIS — N73.0 PID (ACUTE PELVIC INFLAMMATORY DISEASE): Primary | ICD-10-CM

## 2017-10-18 DIAGNOSIS — R10.9 STOMACH PAIN: ICD-10-CM

## 2017-10-18 LAB
BILIRUB UR QL STRIP: NEGATIVE
GLUCOSE UR-MCNC: NEGATIVE MG/DL
KETONES P FAST UR STRIP-MCNC: NEGATIVE MG/DL
PH UR STRIP: 7 [PH] (ref 4.6–8)
PROT UR QL STRIP: NEGATIVE MG/DL
SP GR UR STRIP: 1.02 (ref 1–1.03)
UA UROBILINOGEN AMB POC: NORMAL (ref 0.2–1)
URINALYSIS CLARITY POC: CLEAR
URINALYSIS COLOR POC: YELLOW
URINE BLOOD POC: NORMAL
URINE LEUKOCYTES POC: NEGATIVE
URINE NITRITES POC: NEGATIVE

## 2017-10-18 PROCEDURE — 88175 CYTOPATH C/V AUTO FLUID REDO: CPT | Performed by: FAMILY MEDICINE

## 2017-10-18 PROCEDURE — 87624 HPV HI-RISK TYP POOLED RSLT: CPT | Performed by: FAMILY MEDICINE

## 2017-10-18 RX ORDER — CEFTRIAXONE 250 MG/8ML
250 INJECTION, POWDER, FOR SOLUTION INTRAMUSCULAR; INTRAVENOUS ONCE
Qty: 1 VIAL | Refills: 0
Start: 2017-10-18 | End: 2017-10-18

## 2017-10-18 RX ORDER — DOXYCYCLINE 100 MG/1
100 TABLET ORAL 2 TIMES DAILY
Qty: 28 TAB | Refills: 0 | Status: SHIPPED | OUTPATIENT
Start: 2017-10-18 | End: 2017-11-01

## 2017-10-18 NOTE — PROGRESS NOTES
Beto  22. Medicine Residency Program     Outpatient Resident Progress Note    History of Present Illness:     Pt is a 42y.o. year old female, who presents to clinic for pelvic pain since 3 days ago. She noticed burning sensation during urination, with dark yellow urine, no foul smell, or blood. She denies purulent secretions from urine or vagina. Denies fever, CP, SOB, nauseas, vomits, diarrhea, constipation. She reports a previous urinary infection 4 months ago, that felt very similar to current presentation. Chief Complaint   Patient presents with    Abdominal Pain    Urinary Burning    Back Pain       Review of Systems (Negative unless in bold)  Constitutional: Negative for chills, fever, malaise/fatigue and weight loss. HENT: Negative for congestion, ear discharge, ear pain, hearing loss, nosebleeds, sore throat and tinnitus. Eyes: Negative for blurred vision, double vision, photophobia, pain, discharge and redness. Respiratory: Negative for cough, hemoptysis, sputum production, shortness of breath, wheezing and stridor. Cardiovascular: Negative for chest pain, palpitations, orthopnea, claudication, leg swelling and PND. Gastrointestinal: Negative for abdominal pain, blood in stool, constipation, diarrhea, heartburn, melena, nausea and vomiting. Genitourinary: Negative for dysuria, pelvic pain, flank pain, frequency, hematuria and urgency. Musculoskeletal: Negative for back pain, falls, joint pain, myalgias and neck pain. Skin: Negative for itching and rash. Neurological: Negative for dizziness, tingling, tremors, sensory change, speech change, focal weakness, seizures, loss of consciousness, weakness and headaches. Endo/Heme/Allergies: Negative for environmental allergies and polydipsia. Does not bruise/bleed easily. Psychiatric/Behavioral: Negative for depression, hallucinations, memory loss, substance abuse and suicidal ideas.  The patient is not nervous/anxious and does not have insomnia. Allergies - reviewed:   No Known Allergies    Medications - reviewed:   Current Outpatient Prescriptions   Medication Sig    cefTRIAXone (ROCEPHIN) 250 mg injection 250 mg by IntraMUSCular route once for 1 dose.  doxycycline (ADOXA) 100 mg tablet Take 1 Tab by mouth two (2) times a day for 14 days.  ibuprofen (MOTRIN) 600 mg tablet Take 1 Tab by mouth every six (6) hours as needed for Pain. [place] 1 pastilla cada 6 horas si necissita por diaz dolor     No current facility-administered medications for this visit. Past Medical History - reviewed:  Past Medical History:   Diagnosis Date    Pap smear for cervical cancer screening 08/ 2012    normal       Objective  Visit Vitals    /81 (BP 1 Location: Left arm, BP Patient Position: Sitting)    Pulse 82    Temp 98.7 °F (37.1 °C) (Oral)    Resp 16    Ht 5' 4\" (1.626 m)    Wt 180 lb (81.6 kg)    SpO2 99%    BMI 30.9 kg/m2     Body mass index is 30.9 kg/(m^2). Physical Exam  Constitutional: Oriented to person, place, and time and well-developed, well-nourished, and in no distress. Obese. HENT:   Head: Normocephalic and atraumatic. Right Ear: External ear normal.   Left Ear: External ear normal.   Nose: Nose normal.   Mouth/Throat: Oropharynx is clear and moist. No oropharyngeal exudate. Eyes: Conjunctivae and EOM are normal. Pupils are equal, round, and reactive to light. Right eye exhibits no discharge. Left eye exhibits no discharge. No scleral icterus. Neck: Normal range of motion. Neck supple. No JVD present. No tracheal deviation present. No thyromegaly present. Cardiovascular: Normal rate, regular rhythm, normal heart sounds and intact distal pulses. Exam reveals no gallop and no friction rub. No murmur heard. Pulmonary/Chest: Effort normal and breath sounds normal. No respiratory distress. No wheezes, no rales, no tenderness. Abdominal: Soft.  Bowel sounds are normal. No distension and no mass. There is no tenderness. There is no rebound and no guarding. Musculoskeletal: Normal range of motion. Exhibits no edema, tenderness or deformity. Lymphadenopathy: no cervical adenopathy. Neurological: Alert and oriented to person, place, and time. Normal reflexes. No cranial nerve deficit. Gait normal. Coordination normal.   Skin: Skin is warm and dry. No rash noted. Not diaphoretic. No erythema. No pallor. Psychiatric: Mood, memory, affect and judgment normal.   Nursing note and vitals reviewed. Pelvic: Mild tenderness on palpation of the pelvic region. On bimanual pelvic exam, patient showed moderate pain and discomfort on cervical motion. No adnexa palpated. Milky discharge visualized on speculum exam.     Assessment/ Plan:   Ms. Asuncion Rousseau is a 42 y.o. female, with PID. UA negative for Nitrates, LE, and bacterias. Pap smear was collected. On wet mount and KOH no organisms were visualized. Will treat empirically with a dose of Rocephin 250 mg IM and Doxycycline 100 mg PO BID for 14 days to treat PID. Will follow up with our practice in 14 days for results of Pap Smear, GC tests, and treatment response. Patient was advised to return to clinic in case of worsening of symptoms even with compliance with this treatment, or new onset of fatigue, dizziness, headaches, changes in vision, CP, SOB, abdominal pain or tenderness, dysuria, hematuria, melena, hematochezia, leg swelling. Body mass index is 30.9 kg/(m^2). Lynn Palm Discussed the patient's I have reviewed/discussed the above normal BMI with the patient and spouse. I have recommended the following interventions: dietary management education, guidance, and counseling, encourage exercise and monitor weight . ICD-10-CM ICD-9-CM    1.  PID (acute pelvic inflammatory disease) N73.0 614.3 PAP IG, APTIMA HPV AND RFX 16/18,45 ([id number])      AMB POC URINE PREGNANCY TEST, VISUAL COLOR COMPARISON      AMB POC SMEAR, STAIN & INTERPRET, WET MOUNT      CHLAMYDIA/GC AMPLIFICATON THINPREP      CULTURE, URINE      CEFTRIAXONE SODIUM INJECTION  MG      SD THER/PROPH/DIAG INJECTION, SUBCUT/IM      cefTRIAXone (ROCEPHIN) 250 mg injection      doxycycline (ADOXA) 100 mg tablet   2. Stomach pain R10.9 536.8 AMB POC URINALYSIS DIP STICK AUTO W/O MICRO      PAP IG, APTIMA HPV AND RFX 16/18,45 ([id number])      AMB POC URINE PREGNANCY TEST, VISUAL COLOR COMPARISON      AMB POC SMEAR, STAIN & INTERPRET, WET MOUNT      CHLAMYDIA/GC AMPLIFICATON THINPREP      CULTURE, URINE     Diagnoses and all orders for this visit:    1. PID (acute pelvic inflammatory disease)  -     PAP IG, APTIMA HPV AND RFX 16/18,45 ([id number])  -     AMB POC URINE PREGNANCY TEST, VISUAL COLOR COMPARISON  -     AMB POC SMEAR, STAIN & INTERPRET, WET MOUNT  -     CHLAMYDIA/GC AMPLIFICATON THINPREP  -     CULTURE, URINE  -     CEFTRIAXONE SODIUM INJECTION  MG  -     SD THER/PROPH/DIAG INJECTION, SUBCUT/IM  -     cefTRIAXone (ROCEPHIN) 250 mg injection; 250 mg by IntraMUSCular route once for 1 dose.  -     doxycycline (ADOXA) 100 mg tablet; Take 1 Tab by mouth two (2) times a day for 14 days. 2. Stomach pain  -     AMB POC URINALYSIS DIP STICK AUTO W/O MICRO  -     PAP IG, APTIMA HPV AND RFX 16/18,45 ([id number])  -     AMB POC URINE PREGNANCY TEST, VISUAL COLOR COMPARISON  -     AMB POC SMEAR, STAIN & INTERPRET, WET MOUNT  -     CHLAMYDIA/GC AMPLIFICATON THINPREP  -     CULTURE, URINE        Follow-up Disposition:  Return in about 2 weeks (around 11/1/2017), or if symptoms worsen or fail to improve, for F/U PID. · I have discussed the diagnosis with the patient and the intended plan as seen in the above orders. The patient has received an after-visit summary and questions were answered concerning future plans. I have discussed medication side effects and warnings with the patient as well.       Patient/Plan discussed with Dr. Fred Abdul (Attending Physician)    Rosalba Crews MD Josue  PGY-2 Family Medicine Resident

## 2017-10-18 NOTE — MR AVS SNAPSHOT
Visit Information Shaheen Celeste Personal Médico Departamento Teléfono del Dep. Número de visita 10/18/2017  5:45 PM Raudel Farmer MD [address] [facility] [phone] [phone] Follow-up Instructions Return in about 2 weeks (around 11/1/2017), or if symptoms worsen or fail to improve, for F/U PID. Upcoming Health Maintenance Date Due DTaP/Tdap/Td series (1 - Tdap) 2/24/1999 PAP AKA CERVICAL CYTOLOGY 2/24/1999 INFLUENZA AGE 9 TO ADULT 8/1/2017 Alergias  Review Complete El: 10/18/2017 Por: Blaine Walker LPN A partir del:  10/18/2017 No Known Allergies Vacunas actuales Radha Griffes No hay ninguna vacuna archivada. No revisadas esta visita You Were Diagnosed With   
  
 Ivan Grayd PID (acute pelvic inflammatory disease)    -  Primary ICD-10-CM: N73.0 ICD-9-CM: 614.3 Stomach pain     ICD-10-CM: R10.9 ICD-9-CM: 536.8 Partes vitales PS Pulso Temperatura Resp [place] ( percentil de crecimiento) Peso (percentil de crecimiento) 130/81 (BP 1 Location: Left arm, BP Patient Position: Sitting) 82 98.7 °F (37.1 °C) (Oral) 16 5' 4\" (1.626 m) 180 lb (81.6 kg) SpO2 BMI (IMC) Estado obstétrico Estatus de tabaquísmo 99% 30.9 kg/m2 Having regular periods Never Smoker Historial de signos vitales BMI and BSA Data Body Mass Index Body Surface Area 30.9 kg/m 2 1.92 m 2 [facility] Phone [address] [facility] [phone] Quinteros lista de medicamentos actualizada Lista actualizada el: 10/18/17  7:11 PM.  Thomasenia Mix use quinteros lista de medicamentos más reciente. cefTRIAXone 250 mg injection También conocido ronan:  ROCEPHIN  
250 mg by IntraMUSCular route once for 1 dose. doxycycline 100 mg tablet También conocido ronan:  ADOXA Take 1 Tab by mouth two (2) times a day for 14 days. ibuprofen 600 mg tablet También conocido ronan:  MOTRIN Take 1 Tab by mouth every six (6) hours as needed for Pain. [place] 1 pastilla cada 6 horas si necissita por diaz dolor Recetas Enviado a la Renan Refills  
 doxycycline (ADOXA) 100 mg tablet 0 Sig: Take 1 Tab by mouth two (2) times a day for 14 days. Class: Normal  
 [facility]acy: [id number] [address] [place] [address] #: [phone] Route: Oral  
  
Hicimos lo siguiente AMB POC SMEAR, STAIN & [place] MOUNT [id number] CPT(R)] AMB POC URINALYSIS DIP STICK AUTO W/O MICRO [17612 CPT(R)] AMB POC URINE PREGNANCY TEST, VISUAL COLOR COMPARISON [84428 CPT(R)] CEFTRIAXONE SODIUM INJECTION  MG [ [facility]] Comentarios:  
 Mix per protocol CHLAMYDIA/GC AMPLIFICATON THINPREP [[id number] Custom] CULTURE, URINE [id number] CPT(R)] PAP IG, APTIMA HPV AND RFX 16/18,45 ([id number]) [[id number] Custom] DE THER/PROPH/DIAG INJECTION, SUBCUT/IM [id number] CPT(R)] Instrucciones de seguimiento Return in about 2 weeks (around 11/1/2017), or if symptoms worsen or fail to improve, for F/U PID. Instrucciones para el Paciente Enfermedad inflamatoria pélvica: Instrucciones de cuidado - [ Pelvic Inflammatory Disease: Care Instructions ] Instrucciones de cuidado La enfermedad inflamatoria pélvica, o EIP, es ne infección de las trompas de Falopio y otros órganos reproductores de la alexa. La EIP, por lo general, es causada por ne infección de transmisión sexual (STI, por rodri siglas en inglés), ronan la gonorrea o la clamidia. La EIP puede causar cicatrices en las trompas de [place], lo que hace difícil que ne alexa quede [place]. Tener ne STI aumenta diaz riesgo de otras STI, tales ronan herpes genital, verrugas genitales, sífilis y [address] [facility]. Es importante zarina todo el medicamento que le fue recetado. La EIP puede causar graves problemas de nuris si usted no completa diaz tratamiento. La atención de seguimiento es ne parte clave de diaz tratamiento y seguridad. Asegúrese de hacer y acudir a todas las citas, y llame a diaz médico si está teniendo problemas. También es ne buena idea saber los resultados de los exámenes y mantener ne lista de los medicamentos que ayush. Cómo puede cuidarse en el hogar? · [address] No deje de tomarlos por el hecho de sentirse mejor. Debe zarina todos los antibióticos hasta terminarlos. · Descanse hasta que la fiebre y el dolor hayan vinh. · [employer] analgésicos (medicamentos para el dolor) exactamente según las indicaciones. ¨ Si el médico le recetó un analgésico, tómelo según las indicaciones. ¨ Si no está tomando un analgésico recetado, pregúntele a diaz médico si puede zarina sayra de [employer]. · Póngase ne almohadilla térmica (ajustada a baja temperatura) o ne bolsa de [place] sobre el vientre para el dolor. · No use lavados vaginales. · No tenga relaciones sexuales ni use tampones (puede usar toallas sanitarias en vez de los tampones) hasta que haya tomado todos rodri medicamentos, el dolor haya desaparecido y se sienta yovany por completo. · Hable con todas las personas con las que haya tenido relaciones sexuales en los últimos 2 meses. Tienen que [employer] pruebas y kolby vez deban ser tratados por STI. [address] STI · Use condones de látex cada vez que tenga relaciones sexuales. Úselos desde el inicio hasta el final del contacto sexual. 
· Hable con diaz hernando antes de tener relaciones sexuales. [place] si diaz hernando tiene alguna infección de transmisión sexual (STI, por rodri siglas en inglés) o si presenta riesgo de tenerla. Recuerde que ne persona puede transmitir ne STI aun cuando no tenga síntomas. · No tenga relaciones sexuales con ninguna persona que tenga síntomas de ne STI, tales ronan llagas en los genitales o la boca. · Tener ne merrick hernando sexual (que no tenga STI ni relaciones sexuales con otras personas) es ne manera buena de evitar las STI. Cuándo debe pedir ayuda? Llame a diaz médico ahora mismo o busque atención médica inmediata si: · Tiene fiebre nueva o más katia. · [facility]. · Piensa que podría estar embarazada. Preste especial atención a los cambios en diaz nuris y asegúrese de comunicarse con diaz médico si: 
· Vomita o tiene diarrea. · No está mejorando después de 2 días. Dónde puede encontrar más información en inglés? Kaern Payne a [email or web address]-michelle.[email or web address] Escriba N294 en la búsqueda para aprender más acerca de \"Enfermedad inflamatoria pélvica: Instrucciones de cuidado - [ Pelvic Inflammatory Disease: Care Instructions ]. \" 
Revisado: 13 octubre, 2016 Versión del contenido: 11.3 © 1079-8632 [employer]wise, Incorporated. Las instrucciones de cuidado fueron adaptadas bajo licencia por [employer] (which disclaims liability or warranty for this information). Si usted tiene [place] afección médica o sobre estas instrucciones, siempre pregunte a diaz profesional de nuris. Healthwise, Incorporated niega toda garantía o responsabilidad por diaz uso de esta información. Por favor proporcione mitchell resumen de la documentación de cuidado a diaz próximo proveedor. Your primary care clinician is listed as Phys Other. If you have any questions after today's visit, please call [phone].

## 2017-10-18 NOTE — PATIENT INSTRUCTIONS
Enfermedad inflamatoria pélvica: Instrucciones de cuidado - [ Pelvic Inflammatory Disease: Care Instructions ]  Instrucciones de cuidado    La enfermedad inflamatoria pélvica, o EIP, es ne infección de las trompas de Falopio y otros órganos reproductores de la alexa. La EIP, por lo general, es causada por ne infección de transmisión sexual (STI, por rodri siglas en inglés), ronan la gonorrea o la clamidia. La EIP puede causar cicatrices en las trompas de [place], lo que hace difícil que ne alexa quede [place]. Tener ne STI aumenta diaz riesgo de otras STI, tales ronan herpes genital, verrugas genitales, sífilis y [address] [place]. Es importante zarina todo el medicamento que le fue recetado. La EIP puede causar graves problemas de nuris si usted no completa diaz tratamiento. La atención de seguimiento es ne parte clave de diaz tratamiento y seguridad. Asegúrese de hacer y acudir a todas las citas, y llame a diaz médico si está teniendo problemas. También es ne buena idea saber los resultados de los exámenes y mantener ne lista de los medicamentos que ayush. ¿Cómo puede cuidarse en el hogar? · [address] No deje de tomarlos por el hecho de sentirse mejor. Debe zarina todos los antibióticos hasta terminarlos. · Descanse hasta que la fiebre y el dolor hayan vinh. · [employer] analgésicos (medicamentos para el dolor) exactamente según las indicaciones. ¨ Si el médico le recetó un analgésico, tómelo según las indicaciones. ¨ Si no está tomando un analgésico recetado, pregúntele a diaz médico si puede zarina sayra de [employer]. · Póngase ne almohadilla térmica (ajustada a baja temperatura) o ne bolsa de [place] sobre el vientre para el dolor. · No use lavados vaginales. · No tenga relaciones sexuales ni use tampones (puede usar toallas sanitarias en vez de los tampones) hasta que haya tomado todos rodri medicamentos, el dolor haya desaparecido y se sienta yovany por completo.   · Hable con todas las personas con las que haya tenido relaciones sexuales en los últimos 2 meses. Tienen que [employer] pruebas y kolby vez deban ser tratados por STI. Cómo prevenir las STI  · Use condones de látex cada vez que tenga relaciones sexuales. Úselos desde el inicio hasta el final del contacto sexual.  · Hable con diaz hernando antes de tener relaciones sexuales. [place] si diaz hernando tiene alguna infección de transmisión sexual (STI, por rodri siglas en inglés) o si presenta riesgo de tenerla. Recuerde que ne persona puede transmitir ne STI aun cuando no tenga síntomas. · No tenga relaciones sexuales con ninguna persona que tenga síntomas de ne STI, tales ronan llagas en los genitales o la boca. · Tener ne merrick hernando sexual (que no tenga STI ni relaciones sexuales con otras personas) es ne manera buena de evitar las STI. ¿Cuándo debe pedir ayuda? Llame a diaz médico ahora mismo o busque atención médica inmediata si:  · Tiene fiebre nueva o más katia. · El dolor [place]. · Piensa que podría estar embarazada. Preste especial atención a los cambios en diaz nuris y asegúrese de comunicarse con diaz médico si:  · Vomita o tiene diarrea. · No está mejorando después de 2 días. ¿Dónde puede encontrar más información en [facility]ospitals in [place]? Danna Teran a http://micaela-michelle.[email or web address] Escriba N294 en la búsqueda para aprender más acerca de \"Enfermedad inflamatoria pélvica: Instrucciones de cuidado - [ Pelvic Inflammatory Disease: Care Instructions ]. \"  Revisado: 13 octubre, 2016  Versión del contenido: 11.3  © 1888-2374 [employer]. Las instrucciones de cuidado fueron adaptadas bajo licencia por [employer] (which disclaims liability or warranty for this information). Si usted tiene [place] afección médica o sobre estas instrucciones, siempre pregunte a diaz profesional de nuris. [employer] niega toda garantía o responsabilidad por diaz uso de esta información.

## 2017-10-19 NOTE — PROGRESS NOTES
I reviewed with the resident the medical history and the resident's findings on the physical examination. I discussed with the resident the patient's diagnosis and concur with the plan.     Treat empirically for PID given hx and exam findings  Gc/ Chl sent

## 2017-10-20 LAB — BACTERIA UR CULT: ABNORMAL

## 2017-10-24 LAB
C TRACH RRNA SPEC QL NAA+PROBE: NEGATIVE
N GONORRHOEA RRNA SPEC QL NAA+PROBE: NEGATIVE

## 2017-11-01 ENCOUNTER — OFFICE VISIT (OUTPATIENT)
Dept: FAMILY MEDICINE CLINIC | Age: 37
End: 2017-11-01

## 2017-11-01 VITALS
HEIGHT: 64 IN | SYSTOLIC BLOOD PRESSURE: 127 MMHG | TEMPERATURE: 98.2 F | DIASTOLIC BLOOD PRESSURE: 76 MMHG | BODY MASS INDEX: 29.53 KG/M2 | RESPIRATION RATE: 18 BRPM | WEIGHT: 173 LBS | HEART RATE: 66 BPM | OXYGEN SATURATION: 99 %

## 2017-11-01 DIAGNOSIS — N73.0 PID (ACUTE PELVIC INFLAMMATORY DISEASE): Primary | ICD-10-CM

## 2017-11-01 DIAGNOSIS — N39.0 URINARY TRACT INFECTION WITHOUT HEMATURIA, SITE UNSPECIFIED: ICD-10-CM

## 2017-11-01 RX ORDER — METRONIDAZOLE 500 MG/1
500 TABLET ORAL 2 TIMES DAILY
Qty: 14 TAB | Refills: 0 | Status: SHIPPED | OUTPATIENT
Start: 2017-11-01 | End: 2017-11-08

## 2017-11-01 RX ORDER — AMOXICILLIN 500 MG/1
500 CAPSULE ORAL 2 TIMES DAILY
Qty: 14 CAP | Refills: 0 | Status: SHIPPED | OUTPATIENT
Start: 2017-11-01 | End: 2017-11-08

## 2017-11-01 NOTE — MR AVS SNAPSHOT
Visit Information Mckayla Dupont Personal Médico Departamento Teléfono del Dep. Número de visita 11/1/2017 11:05 AM Gus Tyson, Donovan Howard [phone] [id number] Follow-up Instructions Return in about 1 week (around 11/8/2017), or if symptoms worsen or fail to improve. Upcoming Health Maintenance Date Due DTaP/Tdap/Td series (1 - Tdap) 2/24/1999 INFLUENZA AGE 9 TO ADULT 8/1/2017 PAP AKA CERVICAL CYTOLOGY 10/18/2020 Alergias  Review Complete El: 10/18/2017 Por: MD Salome Louise Presser del:  11/1/2017 No Known Allergies Vacunas actuales [place] No hay ninguna vacuna archivada. No revisadas esta visita You Were Diagnosed With   
  
 Deidre Pu PID (acute pelvic inflammatory disease)    -  Primary ICD-10-CM: N73.0 ICD-9-CM: 614.3 Urinary tract infection without hematuria, site unspecified     ICD-10-CM: N39.0 ICD-9-CM: 599.0 Partes vitales PS Pulso Temperatura Resp [place] ( percentil de crecimiento) Peso (percentil de crecimiento) 127/76 (BP 1 Location: Left arm, BP Patient Position: Sitting) 66 98.2 °F (36.8 °C) (Oral) 18 5' 4\" (1.626 m) 173 lb (78.5 kg) LMP (última deepti) SpO2 BMI (IM) Estado obstétrico Estatus de tabaquísmo 10/30/2017 99% 29.7 kg/m2 Having regular periods Never Smoker BMI and BSA Data Body Mass Index Body Surface Area  
 29.7 kg/m 2 1.88 m 2 [facility] Name Phone [address] [facility] [phone] Quinteros lista de medicamentos actualizada Lista actualizada el: 11/1/17 12:25 PM.  Nancy Olivo use quinteros lista de medicamentos más reciente. amoxicillin 500 mg capsule También conocido ronan:  AMOXIL Take 1 Cap by mouth two (2) times a day for 7 days. doxycycline 100 mg tablet También conocido ronan:  ADOXA Take 1 Tab by mouth two (2) times a day for 14 days. ibuprofen 600 mg tablet También conocido ronan:  MOTRIN Take 1 Tab by mouth every six (6) hours as needed for Pain. [place] 1 pastilla cada 6 horas si necissita por diaz dolor  
  
 metroNIDAZOLE 500 mg tablet También conocido ronan:  FLAGYL Take 1 Tab by mouth two (2) times a day for 7 days. Recetas Enviado a la Renan Refills  
 amoxicillin (AMOXIL) 500 mg capsule 0 Sig: Take 1 Cap by mouth two (2) times a day for 7 days. Class: Normal  
 Pharmacy: [id number] [address] [place] [address] Ph #: [phone] Route: Oral  
 metroNIDAZOLE (FLAGYL) 500 mg tablet 0 Sig: Take 1 Tab by mouth two (2) times a day for 7 days. Class: Normal  
 [facility]acy: [id number] [address] [place] [address] Ph #: [phone] Route: Oral  
  
Hicimos lo siguiente CULTURE, URINE [id number] CPT(R)] Instrucciones de seguimiento Return in about 1 week (around 11/8/2017), or if symptoms worsen or fail to improve. Por hacer 11/01/2017 Lab:  CHLAMYDIA/GC PCR Instrucciones para el Paciente Enfermedad inflamatoria pélvica: Instrucciones de cuidado - [ Pelvic Inflammatory Disease: Care Instructions ] Instrucciones de cuidado La enfermedad inflamatoria pélvica, o EIP, es ne infección de las trompas de Falopio y otros órganos reproductores de la alexa. La EIP, por lo general, es causada por ne infección de transmisión sexual (STI, por rodri siglas en inglés), ronan la gonorrea o la clamidia. La EIP puede causar cicatrices en las trompas de [place], lo que hace difícil que ne alexa quede [place]. Tener ne STI aumenta diaz riesgo de otras STI, tales ronan herpes genital, verrugas genitales, sífilis y [address]. Es importante zarina todo el medicamento que le fue recetado.  La EIP puede causar graves problemas de nuris si usted no completa diaz tratamiento. La atención de seguimiento es ne parte clave de diaz tratamiento y seguridad. Asegúrese de hacer y acudir a todas las citas, y llame a diaz médico si está teniendo problemas. También es ne buena idea saber los resultados de los exámenes y mantener ne lista de los medicamentos que ayush. Cómo puede cuidarse en el hogar? · [address] No deje de tomarlos por el hecho de sentirse mejor. Debe zarina todos los antibióticos hasta terminarlos. · Descanse hasta que la fiebre y el dolor hayan vinh. · [employer] analgésicos (medicamentos para el dolor) exactamente según las indicaciones. ¨ Si el médico le recetó un analgésico, tómelo según las indicaciones. ¨ Si no está tomando un analgésico recetado, pregúntele a diaz médico si puede zarina sayra de [employer]. · Póngase ne almohadilla térmica (ajustada a baja temperatura) o ne bolsa de [place] sobre el vientre para el dolor. · No use lavados vaginales. · No tenga relaciones sexuales ni use tampones (puede usar toallas sanitarias en vez de los tampones) hasta que haya tomado todos rodri medicamentos, el dolor haya desaparecido y se sienta yovany por completo. · Hable con todas las personas con las que haya tenido relaciones sexuales en los últimos 2 meses. Tienen que [employer] pruebas y kolby vez deban ser tratados por STI. [address] STI · Use condones de látex cada vez que tenga relaciones sexuales. Úselos desde el inicio hasta el final del contacto sexual. 
· Hable con diaz hernando antes de tener relaciones sexuales. [place] si diaz hernando tiene alguna infección de transmisión sexual (STI, por rodri siglas en inglés) o si presenta riesgo de tenerla. Recuerde que ne persona puede transmitir ne STI aun cuando no tenga síntomas. · No tenga relaciones sexuales con ninguna persona que tenga síntomas de ne STI, tales ronan llagas en los genitales o la boca. · Tener ne merrick hernando sexual (que no tenga STI ni relaciones sexuales con otras personas) es ne manera buena de evitar las STI. Cuándo debes pedir ayuda? Llama a tu médico ahora mismo o busca atención médica inmediata si: 
? · Vuelves a tener fiebre o tienes fiebre más katia. ? · Tu dolor [facility]. ? · Piensas que puedes estar embarazada. ?Presta especial atención a los cambios en tu nuris y asegúrate de comunicarte con tu médico si: 
? · Vomitas o tienes diarrea. ? · No mejoras después de 2 días. Dónde puede encontrar más información en inglés? [place] Tony a [email or web address]-michelle.[email or web address] Escriba N294 en la búsqueda para aprender más acerca de \"Enfermedad inflamatoria pélvica: Instrucciones de cuidado - [ Pelvic Inflammatory Disease: Care Instructions ]. \" 
Revisado: 13 octubre, 2016 Versión del contenido: 11.4 © 3436-3248 Healthwise, Incorporated. Las instrucciones de cuidado fueron adaptadas bajo licencia por Good Help Connections (which disclaims liability or warranty for this information). Si usted tiene [place] afección médica o sobre estas instrucciones, siempre pregunte a diaz profesional de nuris. Healthwise, Incorporated niega toda garantía o responsabilidad por diaz uso de esta información. Por favor proporcione mitchell resumen de la documentación de cuidado a diaz próximo proveedor. Your primary care clinician is listed as Phys Other. If you have any questions after today's visit, please call [phone].

## 2017-11-01 NOTE — PATIENT INSTRUCTIONS
Enfermedad inflamatoria pélvica: Instrucciones de cuidado - [ Pelvic Inflammatory Disease: Care Instructions ]  Instrucciones de cuidado    La enfermedad inflamatoria pélvica, o EIP, es ne infección de las trompas de Falopio y otros órganos reproductores de la alexa. La EIP, por lo general, es causada por ne infección de transmisión sexual (STI, por rodri siglas en inglés), ronan la gonorrea o la clamidia. La EIP puede causar cicatrices en las trompas de [place], lo que hace difícil que ne alexa quede [place]. Tener ne STI aumenta diaz riesgo de otras STI, tales ronan herpes genital, verrugas genitales, sífilis y [address]. Es importante zarina todo el medicamento que le fue recetado. La EIP puede causar graves problemas de nuris si usted no completa diaz tratamiento. La atención de seguimiento es ne parte clave de diaz tratamiento y seguridad. Asegúrese de hacer y acudir a todas las citas, y llame a diaz médico si está teniendo problemas. También es ne buena idea saber los resultados de los exámenes y mantener ne lista de los medicamentos que ayush. ¿Cómo puede cuidarse en el hogar? · [address] No deje de tomarlos por el hecho de sentirse mejor. Debe zarina todos los antibióticos hasta terminarlos. · Descanse hasta que la fiebre y el dolor hayan vinh. · [employer] analgésicos (medicamentos para el dolor) exactamente según las indicaciones. ¨ Si el médico le recetó un analgésico, tómelo según las indicaciones. ¨ Si no está tomando un analgésico recetado, pregúntele a diaz médico si puede zarina sayra de [employer]. · Póngase ne almohadilla térmica (ajustada a baja temperatura) o ne bolsa de [place] sobre el vientre para el dolor. · No use lavados vaginales. · No tenga relaciones sexuales ni use tampones (puede usar toallas sanitarias en vez de los tampones) hasta que haya tomado todos rodri medicamentos, el dolor haya desaparecido y se sienta yovany por completo.   · Hable con todas las personas con las que haya tenido relaciones sexuales en los últimos 2 meses. Tienen que [employer] pruebas y kolby vez deban ser tratados por STI. Cómo prevenir las STI  · Use condones de látex cada vez que tenga relaciones sexuales. Úselos desde el inicio hasta el final del contacto sexual.  · Hable con diaz hernando antes de tener relaciones sexuales. [place] si diaz hernando tiene alguna infección de transmisión sexual (STI, por rodri siglas en inglés) o si presenta riesgo de tenerla. Recuerde que ne persona puede transmitir ne STI aun cuando no tenga síntomas. · No tenga relaciones sexuales con ninguna persona que tenga síntomas de ne STI, tales ronan llagas en los genitales o la boca. · Tener ne merrick hernando sexual (que no tenga STI ni relaciones sexuales con otras personas) es ne manera buena de evitar las STI. ¿Cuándo debes pedir ayuda? Llama a tu médico ahora mismo o busca atención médica inmediata si:  ? · Vuelves a tener fiebre o tienes fiebre más katia. ? · Tu dolor [facility]. ? · Piensas que puedes estar embarazada. ?Presta especial atención a los cambios en tu nuris y asegúrate de comunicarte con tu médico si:  ? · Vomitas o tienes diarrea. ? · No mejoras después de 2 días. ¿Dónde puede encontrar más información en inglés? Gem Valerio a [email or web address]-michelle.info/. Escriba N294 en la búsqueda para aprender más acerca de \"Enfermedad inflamatoria pélvica: Instrucciones de cuidado - [ Pelvic Inflammatory Disease: Care Instructions ]. \"  Revisado: 13 octubre, 2016  Versión del contenido: 11.4  © 9671-3752 Healthwise, Vserv. Las instrucciones de cuidado fueron adaptadas bajo licencia por Good Help Connections (which disclaims liability or warranty for this information). Si usted tiene [place] afección médica o sobre estas instrucciones, siempre pregunte a diaz profesional de nuris.  [employer], [employer] niega toda garantía o responsabilidad por diaz uso de esta información.

## 2017-11-01 NOTE — PROGRESS NOTES
HO Teran is a 42 y.o. female who presents for follow up from recent (10/18)visit and lab results. She presented with UTI symptoms and had a cervical motion tenderness on exam. She was treated empirically of Ceftriazone and  Doxycycline 12/14 days completed. No dysuria, urgency or frequency. No vaginal discharge or itching. Had dyspareunia 4 days ago. LMP started today with mild pelvic pain. She did not have cramping the last period. Menses are regular 4-5 days, q 30 days, 3-4 pads a days  Pap smear neg. GC neg  Urine culture showed GBS [id number] colonies   Patient denies fever, chills, abdominal pain  CP/ SOB/ N/V/ diarrhea. Labs reviewed with the patient. Allergies:   No Known Allergies    Meds:  Current Outpatient Prescriptions   Medication Sig Dispense Refill    amoxicillin (AMOXIL) 500 mg capsule Take 1 Cap by mouth two (2) times a day for 7 days. 14 Cap 0    metroNIDAZOLE (FLAGYL) 500 mg tablet Take 1 Tab by mouth two (2) times a day for 7 days. 14 Tab 0    doxycycline (ADOXA) 100 mg tablet Take 1 Tab by mouth two (2) times a day for 14 days. 28 Tab 0    ibuprofen (MOTRIN) 600 mg tablet Take 1 Tab by mouth every six (6) hours as needed for Pain. [place] 1 pastilla cada 6 horas si necissita por diaz dolor 61 Tab 1       PMH:  Past Medical History:   Diagnosis Date    Pap smear for cervical cancer screening 08/ 2012    normal       SH:  Smoker:  History   Smoking Status    Never Smoker   Smokeless Tobacco    Not on file       ETOH:   History   Alcohol Use    Yes     Comment: social       FH:   No family history on file.     ROS: Positive for Items in bold:   Constitutional: headache, fever, fatigue, weight loss or weight gain      Cardiac: chest pain      Resp: cough or shortness of breath      GI: nausea, vomiting, constipation, diarrhea, blood in stool  : frequency, urgency, dysuria, hematuria   Neuro: dizziness, numbness, tingling  Psych: anxiety, depression, stress     Physical Exam:  Visit Vitals    /76 (BP 1 Location: Left arm, BP Patient Position: Sitting)    Pulse 66    Temp 98.2 °F (36.8 °C) (Oral)    Resp 18    Ht 5' 4\" (1.626 m)    Wt 173 lb (78.5 kg)    LMP 10/30/2017    SpO2 99%    BMI 29.7 kg/m2       Gen: No apparent distress. Alert and oriented and responds to all questions appropriately. Lungs: Respirations unlabored; clear to auscultation bilaterally  Cardio: Regular, rate, and rhythm without murmurs, gallops or rubs   Abdomen: Soft; suprapubic tenderness; nondistended; normoactive bowel sounds; no masses or organomegaly  No pelvic exam done today ( Kaylie Last may be equivocal due to current periods)        Assessment and Plan:   Jeremy Caraballo is a 42 y.o. female who presents for follow up for possible PID. We will complete PID treatment empirically with Metronidazole ( which may treat BV). GBS [id number] bacteriuria noted on urine. Will also treat empically with Amoxicillin as she has suprapubic tenderness. Will recheck urine culture and GC on urine. Will consider US in 7 days if dyspareunia persists ( patient worried because of family member of gyn cancer)  Patient to RTC in 7 days for follow up. Instructed to return to clinic if symptoms worsen or fever or chills, nausea vomiting, or acute abdomen. ICD-10-CM ICD-9-CM    1. PID (acute pelvic inflammatory disease) N73.0 614.3 CHLAMYDIA/GC PCR      CULTURE, URINE      amoxicillin (AMOXIL) 500 mg capsule   2. Urinary tract infection without hematuria, site unspecified N39.0 599.0 CHLAMYDIA/GC PCR      CULTURE, URINE      amoxicillin (AMOXIL) 500 mg capsule      metroNIDAZOLE (FLAGYL) 500 mg tablet     Discussed diagnoses in detail with patient. Patient expressed understanding of and agreement to above plan. All questions and concerns addressed. Medication risks/benefits/side effects discussed with patient. Patient is counseled to return to the office and/or ED if symptoms do not improve as expected. Patient  discussed with Dr. Blake Limon, Attending Physician. Gus Butts MD  11/01/17    Family Medicine Resident

## 2017-11-08 ENCOUNTER — OFFICE VISIT (OUTPATIENT)
Dept: FAMILY MEDICINE CLINIC | Age: 37
End: 2017-11-08

## 2017-11-08 NOTE — MR AVS SNAPSHOT
Visit Information Yael Gómez y Leonelveronica Personal Médico Departamento Teléfono del Dep. Número de visita 11/8/2017  9:00 AM Donovan Caldwell [phone] [id number] Upcoming Health Maintenance Date Due DTaP/Tdap/Td series (1 - Tdap) 2/24/1999 Influenza Age 5 to Adult 8/1/2017 PAP AKA CERVICAL CYTOLOGY 10/18/2020 [facility]  Review Complete El: 10/18/2017 Por: Audrey Buck MD  
 Claressa Smyth del:  11/8/2017 No Known Allergies Vacunas actuales Rosangela Jose No hay ninguna vacuna archivada. No revisadas esta visita Partes vitales LMP (última deepti) Estado obstétrico Estatus de tabaquísmo 10/30/2017 Having regular periods Never Smoker Quinteros lista de medicamentos actualizada Lista actualizada el: 11/8/17  4:57 PM.  Tomas Meredith use quinteros lista de medicamentos más reciente. amoxicillin 500 mg capsule También conocido ronan:  AMOXIL Take 1 Cap by mouth two (2) times a day for 7 days. ibuprofen 600 mg tablet También conocido ronan:  MOTRIN Take 1 Tab by mouth every six (6) hours as needed for Pain. [place] 1 pastilla cada 6 horas si necissita por quinteros dolor  
  
 metroNIDAZOLE 500 mg tablet También conocido ronan:  FLAGYL Take 1 Tab by mouth two (2) times a day for 7 days. Por favor proporcione mitchell resumen de la documentación de cuidado a quinteros próximo proveedor. Your primary care clinician is listed as Phys Other. If you have any questions after today's visit, please call [phone].

## 2018-03-19 ENCOUNTER — OFFICE VISIT (OUTPATIENT)
Dept: FAMILY MEDICINE CLINIC | Age: 38
End: 2018-03-19

## 2018-03-19 VITALS
BODY MASS INDEX: 29.37 KG/M2 | OXYGEN SATURATION: 99 % | WEIGHT: 172 LBS | HEART RATE: 76 BPM | RESPIRATION RATE: 16 BRPM | HEIGHT: 64 IN | TEMPERATURE: 98.1 F | DIASTOLIC BLOOD PRESSURE: 63 MMHG | SYSTOLIC BLOOD PRESSURE: 112 MMHG

## 2018-03-19 DIAGNOSIS — A59.01 TRICHOMONAL VAGINITIS: ICD-10-CM

## 2018-03-19 DIAGNOSIS — B37.31 YEAST VAGINITIS: ICD-10-CM

## 2018-03-19 DIAGNOSIS — R30.0 BURNING WITH URINATION: Primary | ICD-10-CM

## 2018-03-19 LAB
BILIRUB UR QL STRIP: NEGATIVE
GLUCOSE UR-MCNC: NEGATIVE MG/DL
HCG URINE, QL. (POC): NEGATIVE
KETONES P FAST UR STRIP-MCNC: NORMAL MG/DL
PH UR STRIP: 6 [PH] (ref 4.6–8)
PROT UR QL STRIP: NEGATIVE
SP GR UR STRIP: 1.01 (ref 1–1.03)
UA UROBILINOGEN AMB POC: NORMAL (ref 0.2–1)
URINALYSIS CLARITY POC: CLEAR
URINALYSIS COLOR POC: YELLOW
URINE BLOOD POC: NORMAL
URINE LEUKOCYTES POC: NEGATIVE
URINE NITRITES POC: NEGATIVE
VALID INTERNAL CONTROL?: YES

## 2018-03-19 RX ORDER — FLUCONAZOLE 150 MG/1
150 TABLET ORAL DAILY
Qty: 1 TAB | Refills: 0 | Status: SHIPPED | OUTPATIENT
Start: 2018-03-19 | End: 2018-03-20

## 2018-03-19 RX ORDER — METRONIDAZOLE 500 MG/1
2000 TABLET ORAL ONCE
Qty: 4 TAB | Refills: 0 | Status: SHIPPED | OUTPATIENT
Start: 2018-03-19 | End: 2018-03-19

## 2018-03-19 NOTE — PATIENT INSTRUCTIONS
Candidiasis vaginal: Instrucciones de cuidado - [ Vaginal Yeast Infection: Care Instructions ]  Instrucciones de cuidado    La candidiasis vaginal (infección por hongos en forma de levadura) es causada por un exceso de células de hongos de levadura en la vagina. [place] es común en mujeres de todas las edades. La comezón, el flujo vaginal, la irritación y otros síntomas pueden ser [employer]. [place] las infecciones por hongos en forma de [place]rel[place] no suelen causar otros problemas de nuris. Algunos medicamentos pueden aumentar diaz riesgo de contraer la candidiasis vaginal. Estos incluyen antibióticos, pastillas anticonceptivas, hormonas y esteroides. También puede tener más probabilidades de tener candidiasis vaginal si está embarazada, tiene diabetes, [place] lavados vaginales o viste ropas apretadas. Con tratamiento, la mayoría de infecciones por hongos en forma de levadura mejoran en 2 o 3 días. La atención de seguimiento es ne parte clave de diaz tratamiento y seguridad. Asegúrese de hacer y acudir a todas las citas, y llame a diaz médico si está teniendo problemas. También es ne buena idea saber los resultados de los exámenes y mantener ne lista de los medicamentos que ayush. Cómo puede cuidarse en el hogar? · [employer] medicamentos exactamente ronan le fueron recetados. Llame a diaz médico si brayan estar teniendo problemas con diaz medicamento. · Pregunte a diaz médico sobre medicamentos de venta jackeline para las infecciones por hongos en forma de Orelia Six. Podrían costar menos que los medicamentos recetados. Si [place] un tratamiento de [facility], nicky y siga todas las instrucciones de la etiqueta. · No use tampones kenia el tiempo que utilice la crema vaginal o supositorios. Los tampones pueden absorber el medicamento. Use toallas sanitarias en lugar de tampones. · Use ropa holgada de algodón. No utilice nylon ni otras telas que conserven el calor corporal y la humedad cerca de la piel.   · Pruebe a dormir sin ropa interior. · No se rasque. Alivie la comezón con ne compresa fría o un baño frío. · No se lave la suhas vaginal más de ne vez al día. Use solo agua corriente o un [place] y sin perfume. Deje que la suhas vaginal se seque con el aire. · Después de nadar, quítese el traje de baño mojado. · No tenga relaciones sexuales hasta que haya terminado diaz tratamiento. · No use lavados vaginales. Cuándo debe pedir ayuda? Llame a diaz médico ahora mismo o busque atención médica inmediata si:  ? · Tiene sangrado vaginal inesperado. ? · Tiene un dolor nuevo o más intenso en la vagina o la pelvis. ?Preste especial atención a los cambios en diaz nuris y asegúrese de comunicarse con diaz médico si:  ? · Tiene fiebre. ? · No está mejorando después de 2 días. ? · [place] síntomas vuelven después de terminar martha medicamentos. Dónde puede encontrar más información en inglés? Aliyah [place] a [email or web address]-michelle.[email or web address] Anil  K843 en la búsqueda para aprender más acerca de \"Candidiasis vaginal: Instrucciones de cuidado - [ Vaginal Yeast Infection: Care Instructions ]. \"  Revisado: 13 octubre, 2016  Versión del contenido: 11.4  © 8957-0362 Healthwise, Incorporated. Las instrucciones de cuidado fueron adaptadas bajo licencia por Good Help Connections (which disclaims liability or warranty for this information). Si usted tiene [place] afección médica o sobre estas instrucciones, siempre pregunte a diaz profesional de nuris. Healthwise, Incorporated niega toda garantía o responsabilidad por diaz uso de esta información. Tricomoniasis: Instrucciones de cuidado - [ Trichomoniasis: Care Instructions ]  Instrucciones de cuidado  La tricomoniasis es ne infección de transmisión sexual (STI, por martha siglas en inglés) que se transmite teniendo relaciones sexuales con ne hernando infectada. En las mujeres, puede provocar comezón vaginal y ne secreción olorosa.  [place] en muchos casos, especialmente en los hombres, no hay síntomas. La tricomoniasis se trata para prevenir que se transmita a otras personas. Tanto usted ronan diaz hernando o parejas sexuales deben tratarse al mismo tiempo para que no se vuelvan a infectar unos a otros. La tricomoniasis podría causar problemas con [place]bi Papa. Diaz médico hablará con usted acerca del tratamiento para la tricomoniasis si [place]. La atención de seguimiento es ne parte clave de diaz tratamiento y seguridad. Asegúrese de hacer y acudir a todas las citas, y llame a diaz médico si está teniendo problemas. También es ne buena idea saber los resultados de los exámenes y mantener ne lista de los medicamentos que ayush. Cómo puede cuidarse en el hogar? · [employer] antibióticos según las indicaciones. No deje de tomarlos por el hecho de sentirse mejor. Debe zarina todos los antibióticos hasta terminarlos. · No tenga relaciones sexuales mientras esté en tratamiento. Si diaz médico le rose ne dosis [place] de antibióticos, no tenga relaciones kenia ne semana después del tratamiento y [place] diaz hernando haya sido tratada. · Dígale a diaz hernando sexual (o parejas sexuales) que también necesitará exámenes y tratamiento. · Aplíquese compresas de agua fría o tome lisa frescos para aliviar la comezón. Cómo prevenir la tricomoniasis en el futuro  · Use condones de látex cada vez que tenga relaciones sexuales. Úselos desde el inicio hasta el final del contacto sexual.  · Hable con diaz hernando antes de tener relaciones sexuales. [place] si tiene o está en riesgo de contraer tricomoniasis o cualquier otra STI. Recuerde que ne persona puede transmitir ne STI aun cuando no tenga síntomas. · No tenga relaciones sexuales si está en tratamiento por la tricomoniasis o cualquier otra STI. · No tenga relaciones sexuales con ninguna persona que tenga síntomas de ne STI, tales ronan llagas en los genitales o la boca.   · Tener ne merrick hernando sexual (que no tenga STI ni relaciones sexuales con otras personas) es ne manera buena de evitar las STI. Cuándo debe pedir ayuda? Llame a diaz médico ahora mismo o busque atención médica inmediata si:  ? · Tiene sangrado vaginal inusual.   ? · Tiene fiebre. ? · Tiene un nuevo flujo de la vagina o el pene. ? · Tiene dolor en la pelvis. ?Preste especial atención a los cambios en diaz nuris y asegúrese de comunicarse con diaz médico si:  ? · No mejora ronan se esperaba. ? · Presenta algún síntoma nuevo o empeoran los que ya tenía. Dónde puede encontrar más información en inglés? Ramona Kaur a [email or web address]-michelle.[email or web address] Cassandra Mcclelland [id number] en la búsqueda para aprender más acerca de \"Tricomoniasis: Instrucciones de cuidado - [ Trichomoniasis: Care Instructions ]. \"  Revisado: 20 Adria Berman 2017  Versión del contenido: 11.4  © 7426-9699 Healthwise, Incorporated. Las instrucciones de cuidado fueron adaptadas bajo licencia por Good Help Connections (which disclaims liability or warranty for this information). Si usted tiene [place] afección médica o sobre estas instrucciones, siempre pregunte a diaz profesional de nuris. [employer] [place] niega toda garantía o responsabilidad por diaz uso de esta información.

## 2018-03-19 NOTE — MR AVS SNAPSHOT
[address] 
[phone] Patient: Adelso Faustin MRN: [id number] SHAHID:[id number] Visit Information Aiden duncan Bertha Personal Médico Departamento Teléfono del Dep. Número de visita 3/19/2018  3:20 PM Jessica Ellison [facility] [phone] Follow-up Instructions Return if symptoms worsen or fail to improve. Upcoming Health Maintenance Date Due DTaP/Tdap/Td series (1 - Tdap) 2/24/1999 Influenza Age 5 to Adult 8/1/2017 PAP AKA CERVICAL CYTOLOGY 10/18/2020 Alergias  Review Complete El: 10/18/2017 Por: MD Solitario Shields del:  3/19/2018 No Known Allergies Vacunas actuales Gino Limon No hay ninguna vacuna archivada. No revisadas esta visita You Were Diagnosed With   
  
 Polly Coho Burning with urination    -  Primary ICD-10-CM: R30.0 ICD-9-CM: 788.1 Yeast vaginitis     ICD-10-CM: B37.3 ICD-9-CM: 112.1 Trichomonal vaginitis     ICD-10-CM: A59.01 
ICD-9-CM: 131.01 Partes vitales PS Pulso Temperatura Resp Carmine ( percentil de crecimiento) Peso (percentil de crecimiento) 112/63 (BP 1 Location: Left arm, BP Patient Position: Sitting) 76 98.1 °F (36.7 °C) (Oral) 16 5' 4\" (1.626 m) 172 lb (78 kg) SpO2 [place] Estado obstétrico Estatus de tabaquísmo 99% 29.52 kg/m2 Having regular periods Never Smoker Historial de signos vitales BMI and BSA Data Body Mass Index Body Surface Area  
 29.52 kg/m 2 1.88 m 2 Yolande Doran Pharmacy Name Phone [address], [address] [facility] [phone] Quinteros lista de medicamentos actualizada [place] actualizada 3/19/18  4:16 PM.  Jimbo Quintanilla use quinteros lista de medicamentos más reciente. fluconazole 150 mg tablet También conocido ronan:  DIFLUCAN Take 1 Tab by mouth daily for 1 day. FDA advises cautious prescribing of oral fluconazole in pregnancy. ibuprofen 600 mg tablet También conocido ronan:  MOTRIN Take 1 Tab by mouth every six (6) hours as needed for Pain. [place] 1 pastilla cada 6 horas si necissita por diaz dolor  
  
 metroNIDAZOLE 500 mg tablet También conocido ronan:  FLAGYL Take 4 Tabs by mouth once for 1 dose. Recetas Enviado a la Luna Refills  
 metroNIDAZOLE (FLAGYL) 500 mg tablet 0 Sig: Take 4 Tabs by mouth once for 1 dose. Class: Normal  
 Pharmacy: [facility] [place] [address] Ph #: [phone] Route: Oral  
 fluconazole (DIFLUCAN) 150 mg tablet 0 Sig: Take 1 Tab by mouth daily for 1 day. FDA advises cautious prescribing of oral fluconazole in pregnancy. Class: Normal  
 [facility] [address] [place] [address] Ph #: [phone] Route: Oral  
  
Hicimos lo siguiente AMB POC URINALYSIS DIP STICK AUTO W/O MICRO [28252 CPT(R)] AMB POC URINE PREGNANCY TEST, VISUAL COLOR COMPARISON [48332 CPT(R)] AMB POC WET MOUNT [96469 CPT(R)] CULTURE, URINE [id number] CPT(R)] Instrucciones de seguimiento Return if symptoms worsen or fail to improve. Instrucciones para el Paciente Candidiasis vaginal: Instrucciones de cuidado - [ Vaginal Yeast Infection: Care Instructions ] Instrucciones de cuidado La candidiasis vaginal (infección por hongos en forma de levadura) es causada por un exceso de células de hongos de levadura en la vagina. Agenda es común en mujeres de todas las edades. La comezón, el flujo vaginal, la irritación y otros síntomas pueden ser [employer]. Kiley las infecciones por hongos en forma de Delroy Cuff no suelen causar otros problemas de nuris.  
Algunos medicamentos pueden aumentar diaz riesgo de contraer la candidiasis vaginal. Estos incluyen antibióticos, pastillas anticonceptivas, hormonas y esteroides. También puede tener más probabilidades de tener candidiasis vaginal si está embarazada, tiene diabetes, [place] lavados vaginales o viste ropas apretadas. Con tratamiento, la mayoría de infecciones por hongos en forma de levadura mejoran en 2 o 3 días. La atención de seguimiento es ne parte clave de diaz tratamiento y seguridad. Asegúrese de hacer y acudir a todas las citas, y llame a diaz médico si está teniendo problemas. También es ne buena idea saber los resultados de los exámenes y mantener ne lista de los medicamentos que ayush. Cómo puede cuidarse en el hogar? · [employer] medicamentos exactamente ronan le fueron recetados. Llame a diaz médico si brayan estar teniendo problemas con diaz medicamento. · Pregunte a diaz médico sobre medicamentos de venta jackeline para las infecciones por hongos en forma de Ashia Race. Podrían costar menos que los medicamentos recetados. Si [place] un tratamiento de [employer], nicky y siga todas las instrucciones de la etiqueta. · No use tampones kenia el tiempo que utilice la crema vaginal o supositorios. Los tampones pueden absorber el medicamento. Use toallas sanitarias en lugar de tampones. · Use ropa holgada de algodón. No utilice nylon ni otras telas que conserven el calor corporal y la humedad cerca de la piel. · Pruebe a dormir sin ropa interior. · No se rasque. Alivie la comezón con ne compresa fría o un baño frío. · No se lave la suhas vaginal más de ne vez al día. Use solo agua corriente o un Buelah Pock y sin perfume. Deje que la suhas vaginal se seque con el aire. · Después de nadar, quítese el traje de baño mojado. · No tenga relaciones sexuales hasta que haya terminado diaz tratamiento. · No use lavados vaginales. Cuándo debe pedir ayuda? Llame a diaz médico ahora mismo o busque atención médica inmediata si: 
? · Tiene sangrado vaginal inesperado. ? · Tiene un dolor nuevo o más intenso en la vagina o la pelvis. ?Preste especial atención a los cambios en diaz nuris y asegúrese de comunicarse con diaz médico si: 
? · Tiene fiebre. ? · No está mejorando después de 2 días. ? · Martha síntomas vuelven después de terminar martha medicamentos. Dónde puede encontrar más información en inglés? Ramona Kaur a http://micaela-michelle.[email or web address] Cassandra Mcclelland K518 en la búsqueda para aprender más acerca de \"Candidiasis vaginal: Instrucciones de cuidado - [ Vaginal Yeast Infection: Care Instructions ]. \" 
Revisado: 13 octubre, 2016 Versión del contenido: 11.4 © 7714-9353 Healthwise, Incorporated. Las instrucciones de cuidado fueron adaptadas bajo licencia por [facility] Connections (which disclaims liability or warranty for this information). Si usted tiene [place] afección médica o sobre estas instrucciones, siempre pregunte a diaz profesional de nuris. Healthwise, Incorporated niega toda garantía o responsabilidad por diaz uso de esta información. Tricomoniasis: Instrucciones de cuidado - [ Trichomoniasis: Care Instructions ] Instrucciones de cuidado La tricomoniasis es ne infección de transmisión sexual (STI, por martha siglas en inglés) que se transmite teniendo relaciones sexuales con ne hernando infectada. En las mujeres, puede provocar comezón vaginal y ne secreción olorosa. Kiley en muchos casos, especialmente en los hombres, no hay síntomas. La tricomoniasis se trata para prevenir que se transmita a otras personas. Tanto usted ronan diaz hernando o parejas sexuales deben tratarse al mismo tiempo para que no se vuelvan a infectar unos a otros. La tricomoniasis podría causar problemas con Consuelo Lint. Diaz médico hablará con usted acerca del tratamiento para la tricomoniasis si Berl Keyshawn. La atención de seguimiento es ne parte clave de diaz tratamiento y seguridad.  Asegúrese de hacer y acudir a todas las citas, y llame a diaz médico si está teniendo problemas. También es ne buena idea saber los resultados de los exámenes y mantener ne lista de los medicamentos que ayush. Cómo puede cuidarse en el hogar? · [employer] antibióticos según las indicaciones. No deje de tomarlos por el hecho de sentirse mejor. Debe zarina todos los antibióticos hasta terminarlos. · No tenga relaciones sexuales mientras esté en tratamiento. Si diaz médico le rose ne dosis [place] de antibióticos, no tenga relaciones kenia ne semana después del tratamiento y [place]ác[place] diaz hernando haya sido tratada. · Dígale a diaz hernando sexual (o parejas sexuales) que también necesitará exámenes y tratamiento. · Aplíquese compresas de agua fría o tome lisa frescos para aliviar la comezón. Cómo prevenir la tricomoniasis en el futuro · Use condones de látex cada vez que tenga relaciones sexuales. Úselos desde el inicio hasta el final del contacto sexual. 
· Hable con diaz hernando antes de tener relaciones sexuales. [place] si tiene o está en riesgo de contraer tricomoniasis o cualquier otra STI. Recuerde que ne persona puede transmitir ne STI aun cuando no tenga síntomas. · No tenga relaciones sexuales si está en tratamiento por la tricomoniasis o cualquier otra STI. · No tenga relaciones sexuales con ninguna persona que tenga síntomas de ne STI, tales ronan llagas en los genitales o la boca. · Tener ne merrick hernando sexual (que no tenga STI ni relaciones sexuales con otras personas) es ne manera buena de evitar las STI. Cuándo debe pedir ayuda? Llame a diaz médico ahora mismo o busque atención médica inmediata si: 
? · Tiene sangrado vaginal inusual.  
? · Tiene fiebre. ? · Tiene un nuevo flujo de la vagina o el pene. ? · Tiene dolor en la pelvis. ?Preste especial atención a los cambios en diaz nuris y asegúrese de comunicarse con diaz médico si: 
? · No mejora ronan se esperaba. ? · Presenta algún síntoma nuevo o empeoran los que ya tenía. Dónde puede encontrar más información en inglés? Violetta Proper a http://micaela-michelle.info/. Terrance Shah P384 en la búsqueda para aprender más acerca de \"Tricomoniasis: Instrucciones de cuidado - [ Trichomoniasis: Care Instructions ]. \" 
Revisado: 20 marzo, 2017 Versión del contenido: 11.4 © 5223-9279 Healthwise, Incorporated. Las instrucciones de cuidado fueron adaptadas bajo licencia por [facility] Connections (which disclaims liability or warranty for this information). Si usted tiene [place] afección médica o sobre estas instrucciones, siempre pregunte a diaz profesional de nuris. Healthwise, Incorporated niega toda garantía o responsabilidad por diaz uso de esta información. Por favor proporcione mitchell resumen de la documentación de cuidado a diaz próximo proveedor. Your primary care clinician is listed as Phys Other. If you have any questions after today's visit, please call [phone].

## 2018-03-20 LAB — BACTERIA UR CULT: NO GROWTH

## 2018-03-29 LAB
BACTERIA, POC WET MOUNT: NEGATIVE
CLUE, POC WET MOUNT: NEGATIVE
Lab: ABNORMAL
Lab: NORMAL
RBC, POC WET MOUNT: NEGATIVE
TRICH, POC WET MOUNT: ABNORMAL
WBC, POC WET MOUNT: ABNORMAL
YEAST, POC WET MOUNT: ABNORMAL

## 2018-03-29 NOTE — PROGRESS NOTES
History of Present Illness     Everette Dockery is a 34 y.o. female who presents with a complaint of pelvic symptoms. Current symptoms are: vaginal discharge: scant, vaginal irritation: moderate, dysuria: moderate, pelvic pain: moderate. Onset of symptoms was abrupt and sudden, and has been unchanged since that time. She does experience abdominal discomfort. She does experience burning with urination. She denies genital lesions at this time. STD exposure: denies knowledge of risky exposure. Previous STD history: none  HIV Status: the patient has not had HIV testing. Contraception: none   Problem list shows hx of PID    Patient Active Problem List   Diagnosis Code    PID (acute pelvic inflammatory disease) N73.0     Current Outpatient Prescriptions   Medication Sig Dispense Refill    ibuprofen (MOTRIN) 600 mg tablet Take 1 Tab by mouth every six (6) hours as needed for Pain. [place] 1 pastilla cada 6 horas si necissita por diaz dolor 60 Tab 1     No Known Allergies     Review of Systems  Pertinent items are noted in HPI. Physical Exam     Visit Vitals    /63 (BP 1 Location: Left arm, BP Patient Position: Sitting)    Pulse 76    Temp 98.1 °F (36.7 °C) (Oral)    Resp 16    Ht 5' 4\" (1.626 m)    Wt 172 lb (78 kg)    SpO2 99%    BMI 29.52 kg/m2     General:   alert, cooperative, no distress, appears stated age   Heart: regular rate and rhythm, S1, S2 normal, no murmur, click, rub or gallop   Lungs: clear to auscultation bilaterally   Abdomen: soft, nondistended, normal bowel sounds, tenderness mild - suprapubic, without guarding, without rebound, no hepatosplenomegaly   Pelvic:     Vulva: irritation    Vagina:  thin grey discharge    Cervix: no lesions, no cervical motion tenderness    Uterus: normal size    Adnexa: Normal adnexa, No mass, fullness, tenderness     Urine dipstick:  positive for RBC's and positive for ketones.   Microscopic wet-mount exam shows KOH done, hyphae, trichomonads, white blood cells. Leno Rice UPT: Negative    Assessment/Plan:   1. Burning with urination  See treatment below. RTC if no improvement. - AMB POC URINALYSIS DIP STICK AUTO W/O MICRO  - AMB POC URINE PREGNANCY TEST, VISUAL COLOR COMPARISON  - AMB POC WET MOUNT  - CULTURE, URINE    2. Yeast vaginitis  - fluconazole (DIFLUCAN) 150 mg tablet; Take 1 Tab by mouth daily for 1 day. FDA advises cautious prescribing of oral fluconazole in pregnancy. Dispense: 1 Tab; Refill: 0    3. Trichomonal vaginitis  Discussed need for partner to be treated. Second Rx given for partner.    - metroNIDAZOLE (FLAGYL) 500 mg tablet; Take 4 Tabs by mouth once for 1 dose. Dispense: 4 Tab; Refill: 0      I have discussed the diagnosis with the patient and the intended plan as seen in the above orders. she has expressed understanding. The patient has received an after-visit summary and questions were answered concerning future plans. I have discussed medication side effects and warnings with the patient as well. Follow-up Disposition:  Return if symptoms worsen or fail to improve.     Electronically Signed: Miriam Torres MD  .

## 2019-03-25 ENCOUNTER — OFFICE VISIT (OUTPATIENT)
Dept: FAMILY MEDICINE CLINIC | Age: 39
End: 2019-03-25

## 2019-03-25 VITALS
TEMPERATURE: 98.5 F | HEIGHT: 64 IN | WEIGHT: 175 LBS | DIASTOLIC BLOOD PRESSURE: 84 MMHG | SYSTOLIC BLOOD PRESSURE: 140 MMHG | HEART RATE: 79 BPM | BODY MASS INDEX: 29.88 KG/M2

## 2019-03-25 DIAGNOSIS — J34.3 NASAL TURBINATE HYPERTROPHY: ICD-10-CM

## 2019-03-25 DIAGNOSIS — N64.4 BREAST PAIN, RIGHT: Primary | ICD-10-CM

## 2019-03-25 RX ORDER — FLUTICASONE PROPIONATE 50 MCG
2 SPRAY, SUSPENSION (ML) NASAL DAILY
Qty: 1 BOTTLE | Refills: 5 | Status: SHIPPED | OUTPATIENT
Start: 2019-03-25

## 2019-03-25 RX ORDER — CETIRIZINE HCL 10 MG
10 TABLET ORAL DAILY
Qty: 30 TAB | Refills: 5 | Status: SHIPPED | OUTPATIENT
Start: 2019-03-25

## 2019-03-25 NOTE — PROGRESS NOTES
AVS printed and reviewed. Good RX printed for [employer]. [facility] staff messaged to see if they can see pt . If [facility] cannot see pt then pt will need to be scheduled at [facility] out pt radiology  for mammogram and US. Pt will call [facility] office nurse if she has not heard anything in a few days. Discussion assisted by [facility] , Lety Garcia.

## 2019-03-25 NOTE — PROGRESS NOTES
HISTORY OF PRESENT ILLNESS  Cleve Duval is a 37 y.o. female. HPI  Patient sates she has breast pains. States when she palpates he right breast there is some pain. No lumps palpable but there has been pain. This had been bothering her since she was 26. She had a mammogram and was normal.  States she had a maternal aunt with breast cancer and her mother had ovarian cancer. Right ear becomes itchy. Has been going on for the past 2 months. Review of Systems   Constitutional: Negative for chills and fever. HENT: Negative for ear discharge, hearing loss and tinnitus. + ear itching and runny nose   Respiratory: Negative for cough, shortness of breath and wheezing. Cardiovascular: Negative for chest pain and palpitations. Gastrointestinal: Negative for abdominal pain, heartburn, nausea and vomiting. Genitourinary: Negative for dysuria, frequency and urgency. Right breast pain, noncyclical   Musculoskeletal: Negative for back pain, myalgias and neck pain. /84 (BP 1 Location: Right arm, BP Patient Position: Sitting)   Pulse 79   Temp 98.5 °F (36.9 °C) (Oral)   Ht 5' 4.37\" (1.635 m)   Wt 175 lb (79.4 kg)   LMP 03/03/2019   BMI 29.69 kg/m²   Physical Exam   Constitutional: She is oriented to person, place, and time. She appears well-developed and well-nourished. HENT:   Head: Normocephalic. Right Ear: External ear normal.   Left Ear: External ear normal.   Bilateral turbinate hypertrophy   Eyes: Pupils are equal, round, and reactive to light. Conjunctivae and EOM are normal.   Neck: Normal range of motion. Neck supple. No thyromegaly present. Cardiovascular: Normal rate, regular rhythm and normal heart sounds. No murmur heard. Pulmonary/Chest: Effort normal and breath sounds normal. No respiratory distress. She has no wheezes. She has no rales. Abdominal: Soft. She exhibits no distension. There is no tenderness. Musculoskeletal: Normal range of motion. She exhibits no edema. Neurological: She is alert and oriented to person, place, and time. Skin: Skin is warm. No erythema. ASSESSMENT and PLAN  Diagnoses and all orders for this visit:    1. Breast pain, right  -     ADEN MAMMO RT DX INCL CAD; Future  -     ADEN MAMMO LT DX INCL CAD; Future  -     US BREAST AXILLA RT; Future    2. Nasal turbinate hypertrophy  -     fluticasone propionate (FLONASE) 50 mcg/actuation nasal spray; 2 Sprays by Both Nostrils route daily. -     cetirizine (ZYRTEC) 10 mg tablet; Take 1 Tab by mouth daily.       Will order mammogram and ultrasound  Turbinate hypertrophy,  Will start flonase

## 2019-03-25 NOTE — PROGRESS NOTES
Coordination of Care  1. Have you been to the ER, urgent care clinic since your last visit? Hospitalized since your last visit? No    2. Have you seen or consulted any other health care providers outside of the [facility] since your last visit? Include any pap smears or colon screening. No    Does the patient need refills? YES    Learning Assessment Complete?  yes

## 2019-03-27 ENCOUNTER — TELEPHONE (OUTPATIENT)
Dept: FAMILY MEDICINE CLINIC | Age: 39
End: 2019-03-27

## 2019-04-04 ENCOUNTER — DOCUMENTATION ONLY (OUTPATIENT)
Dept: FAMILY PLANNING/WOMEN'S HEALTH CLINIC | Age: 39
End: 2019-04-04

## 2019-04-04 NOTE — PROGRESS NOTES
I called patient and explained to her the two part option for her [facility] visit to get her seen earlier than August but she said she wants to wait until August as transportation is and issue for her. Looks like she is out in [address] is closer. I would have had to send her to [facility] which is a so much farther.

## 2019-08-07 ENCOUNTER — HOSPITAL ENCOUNTER (OUTPATIENT)
Dept: MAMMOGRAPHY | Age: 39
Discharge: HOME | End: 2019-08-07

## 2019-08-07 ENCOUNTER — OFFICE VISIT (OUTPATIENT)
Dept: FAMILY PLANNING/WOMEN'S HEALTH CLINIC | Age: 39
End: 2019-08-07

## 2019-08-07 VITALS — SYSTOLIC BLOOD PRESSURE: 138 MMHG | DIASTOLIC BLOOD PRESSURE: 86 MMHG

## 2019-08-07 DIAGNOSIS — Z80.41 FAMILY HISTORY OF OVARIAN CANCER: ICD-10-CM

## 2019-08-07 DIAGNOSIS — Z01.419 ENCOUNTER FOR WELL WOMAN EXAM: Primary | ICD-10-CM

## 2019-08-07 DIAGNOSIS — Z12.31 VISIT FOR SCREENING MAMMOGRAM: ICD-10-CM

## 2019-08-07 PROCEDURE — 77067 SCR MAMMO BI INCL CAD: CPT

## 2019-08-07 NOTE — PROGRESS NOTES
Assessment/Plan:    Diagnoses and all orders for this visit:    1. Encounter for well woman exam    2. Family history of ovarian cancer  Mother  of ovarian cancer          [facility], EMELYN Perez expressed understanding of this plan. An AVS was printed and given to the patient.      ----------------------------------------------------------------------    Chief Complaint   Patient presents with    Well Woman     [facility] visit       History of Present Illness:     Has regular periods  Uses condoms  Had mammo about 7 years ago, not clear the reason why she had it early but may have been for breast pain she states, it was negative  In a safe marriage, no risk of DV   Had a pap 2017 and it was normal       Past Medical History:   Diagnosis Date    Pap smear for cervical cancer screening 2012    normal       Current Outpatient Medications   Medication Sig Dispense Refill    fluticasone propionate (FLONASE) 50 mcg/actuation nasal spray 2 Sprays by Both Nostrils route daily. 1 Bottle 5    cetirizine (ZYRTEC) 10 mg tablet Take 1 Tab by mouth daily. 30 Tab 5    ibuprofen (MOTRIN) 600 mg tablet Take 1 Tab by mouth every six (6) hours as needed for Pain. [place] 1 pastilla cada 6 horas si necissita por diaz dolor 60 Tab 1       No Known Allergies    Social History     Tobacco Use    Smoking status: Never Smoker    Smokeless tobacco: Never Used   Substance Use Topics    Alcohol use: Yes     Comment: social    Drug use: No       No family history on file. Physical Exam:     Visit Vitals  /86   LMP 2019 (Approximate)       A&Ox3  WDWN NAD  Respirations normal and non labored  Breast exam neg for mass, tenderness, skin color changes, dimpling or retractions  Pelvic exam- ext neg for lesion or discharge. Vagina and cervix with physiologic appearing discharge. No lesions present.  Uterus and adnexal exam neg for mass or tenderness

## 2019-08-07 NOTE — PROGRESS NOTES
EVERY WOMANS LIFE HISTORY QUESTIONNAIRE       No Yes Comments   Has a doctor ever seen or felt anything wrong with your breast? [x]                                  []                                     Have you ever had a breast biopsy? [x]                                  []                                          When and where was last mammogram performed? 4/2013    Have you ever been told that there was a problem on your mammogram?   No Yes Comments   [x]                                  []                                       Do you have breast implants? No Yes Comments   [x]                                  []                                       When was your last Pap test performed? 10/2017 at [facility]--5 yr test    Have you ever had an abnormal Pap test?   No Yes Comments   [x]                                  []                                       Have you had a hysterectomy? No Yes Comments (why)   [x]                                  []                                       Have you been through menopause? No Yes Date of LMP   [x]                                  []                                  7/2019     Did your mother take INDRA? No Yes Unknown   [x]                                  []                                       Do you have a history of HIV exposure? No Yes    [x]                                  []                                       Have you ever been diagnosed with any type of Cancer   No Yes Comments (type,when,where,type of treatment   [x]                                  []                                          Has a family member been diagnosed with breast or ovarian cancer?    No Yes Comments (which family members, and type   []                                  [x]                                  Mother with ovarian at 62 yrs     Are you taking hormone replacement therapy (HRT)     No Yes Comments   [x]                                  [] How many times have you been pregnant? 5       Number of live births ? 5    Are you experiencing any of the following? No Yes Comments   Nipple Discharge [x]                                  []                                     Breast Lump/Masses [x]                                  []                                     Breast Skin Changes [x]                                  []                                          No Yes Comments   Vaginal Discharge [x]                                  []                                     Abnormal/unusual vaginal bleeding [x]                                  []                                         Are you experiencing any other health problems? None--has been to the [facility]        Age at first period?   13  Age at first birth? 11    Ht--1.7     Wt--160 #

## 2020-02-05 ENCOUNTER — DOCUMENTATION ONLY (OUTPATIENT)
Dept: FAMILY MEDICINE CLINIC | Age: 40
End: 2020-02-05

## 2020-02-06 ENCOUNTER — OFFICE VISIT (OUTPATIENT)
Dept: FAMILY MEDICINE CLINIC | Age: 40
End: 2020-02-06

## 2020-02-06 VITALS
BODY MASS INDEX: 30.56 KG/M2 | DIASTOLIC BLOOD PRESSURE: 79 MMHG | HEIGHT: 64 IN | RESPIRATION RATE: 16 BRPM | HEART RATE: 87 BPM | TEMPERATURE: 98 F | SYSTOLIC BLOOD PRESSURE: 123 MMHG | WEIGHT: 179 LBS | OXYGEN SATURATION: 98 %

## 2020-02-06 DIAGNOSIS — H60.501 ACUTE OTITIS EXTERNA OF RIGHT EAR, UNSPECIFIED TYPE: ICD-10-CM

## 2020-02-06 DIAGNOSIS — M25.561 ACUTE PAIN OF RIGHT KNEE: Primary | ICD-10-CM

## 2020-02-06 RX ORDER — CIPROFLOXACIN 500 MG/1
500 TABLET ORAL 2 TIMES DAILY
Qty: 14 TAB | Refills: 0 | Status: SHIPPED | OUTPATIENT
Start: 2020-02-06 | End: 2020-02-06

## 2020-02-06 NOTE — PROGRESS NOTES
Chief Complaint   Patient presents with    Knee Pain     right knee pain x 2 week. patient states right knee has swelling . patient states right knee hurts when standing. Blood pressure 123/79, pulse 87, temperature 98 °F (36.7 °C), temperature source Oral, resp. rate 16, height 5' 4.37\" (1.635 m), weight 179 lb (81.2 kg), SpO2 98 %. 1. Have you been to the ER, urgent care clinic since your last visit? Hospitalized since your last visit? No    2. Have you seen or consulted any other health care providers outside of the [facility] since your last visit? Include any pap smears or colon screening.  No

## 2020-02-06 NOTE — PROGRESS NOTES
HPI:  Mariaa Arias is a 37 y.o. female who presents with right  knee pain. Right knee pain for the last 2 weeks. Pain is a stabbing, 8/10, localized pain. Pain worse with walking and relieved with rest.   No inciting events leading to pain. No previous knee injuries. Has noticed mild swelling in the knee. No redness. No night time pain waking her from sleep. No recent travel or sick contacts. No fevers, chills or night sweats. Past Medical History:   Diagnosis Date    Pap smear for cervical cancer screening 08/ 2012    normal       Current Outpatient Medications:     fluticasone propionate (FLONASE) 50 mcg/actuation nasal spray, 2 Sprays by Both Nostrils route daily. , Disp: 1 Bottle, Rfl: 5    cetirizine (ZYRTEC) 10 mg tablet, Take 1 Tab by mouth daily. , Disp: 30 Tab, Rfl: 5    ibuprofen (MOTRIN) 600 mg tablet, Take 1 Tab by mouth every six (6) hours as needed for Pain. Judson 1 pastilla cada 6 horas si necissita por diaz dolor, Disp: 60 Tab, Rfl: 1  No Known Allergies  Past Medical History:   Diagnosis Date    Pap smear for cervical cancer screening 08/ 2012    normal     History reviewed. No pertinent family history. ROS: As per HPI otherwise negative. Objective:   Visit Vitals  /79   Pulse 87   Temp 98 °F (36.7 °C) (Oral)   Resp 16   Ht 5' 4.37\" (1.635 m)   Wt 179 lb (81.2 kg)   SpO2 98%   BMI 30.37 kg/m²     Gen: Well appearing. No apparent distress. Alert and oriented. Responds to all questions appropriately. Lungs: No labored respirations. Talking in complete sentences without difficulty.   Musculoskeletal:  Knee: right  Knee Effusion: None  Quadriceps atrophy: None   Popliteal (Bakers) Cyst: Negative   Patellofemoral crepitus: Negative  Q angle:     ROM:  Flexion: 130  Extension: 0   Hip IR/ER: FROM without pain    Alignment:  Foot:   Patellar tracking:     Dynamic Test:  Gait: Normal   Assistive devices: None  One leg squat:     Palpation:   Patella tenderness: None  Patellar tendon tenderness: None  Quad tendon tenderness: None  Medial joint line tenderness: Mild  Lateral joint line tenderness: None  MCL tenderness: None  LCL tenderness: None  Medial facet tenderness: None  Lateral facet tenderness: None  Condyle tenderness: None  Tibia tubercle tenderness: None  Proximal fibula tenderness: None  Plica tenderness: None  Prepatellar bursa tenderness: None  Pes bursa tenderness: None  ITB tenderness: None    Ligament/Meniscal Exam:  Patellar Grind: Negative   Patellar apprehension (medial/lateral): Negative   Lochman: Negative, with good endpoint   Anterior Drawer: Negative   Posterior Drawer: Negative   Valgus stress: Negative with good endpoint   Varus stress: Negative with good endpoint   Dial test: Negative   Anand: Negative   Jessica sign: Negative. Strength (0-5/5):   Flexion: Left: 5/5    Right: 5/5    Extension: Left: 5/5    Right: 5/5    Hip abduction: 5/5    Hip adduction: 5/5      Neuro/Vascular : Pulses intact, no edema, and neurologically intact . Skin: No obvious rash or skin breakdown. ASSESSMENT:    ICD-10-CM ICD-9-CM    1. Acute pain of right knee M25.561 719.46 XR KNEE RT MIN 4 V       PLAN:  Personally reviewed XR: No acute pathology noted. No joint space narrowing, fractire or bony anomalies noted. 1. Home Exercise Program as per handout. 2. Ice 15 minutes, three times a day PRN and after exercise. Can alternate with heat for 15 minutes. Medications:    1. Naproxin (Aleve): 220mg 1-2 tablets twice a day PRN. 2. Acetaminophen (Tylenol):  500mg 1-2 tablets every 6 hours as needed for pain.     RTC: 2-4 weeks

## 2020-02-08 NOTE — PROGRESS NOTES
[address] Medicine Residency Attending Addendum:  Patient encounter was discussed on the day of the encounter with Noa Landaverde MD, performing the key elements of the service. I discussed the findings, assessment and plan with the resident and agree with the resident's findings and plan as documented in the resident's note.       Maria Guadalupe Jones MD, CAQSM, RMSK

## 2020-02-17 ENCOUNTER — DOCUMENTATION ONLY (OUTPATIENT)
Dept: FAMILY MEDICINE CLINIC | Age: 40
End: 2020-02-17

## 2020-02-17 NOTE — PROGRESS NOTES
A letter was sent tot he pt, 1/31/2020, with the new appointment access process announcement. The letter was returned back to the [place] office, in 2/10/2020, with the yellow sticker stating was unable to forward to the pt.  Farhad Dixon, [facility]

## 2021-11-17 ENCOUNTER — OFFICE VISIT (OUTPATIENT)
Dept: FAMILY MEDICINE CLINIC | Age: 41
End: 2021-11-17

## 2021-11-17 VITALS
TEMPERATURE: 97.1 F | BODY MASS INDEX: 30.42 KG/M2 | WEIGHT: 178.2 LBS | HEIGHT: 64 IN | DIASTOLIC BLOOD PRESSURE: 95 MMHG | RESPIRATION RATE: 18 BRPM | OXYGEN SATURATION: 98 % | HEART RATE: 67 BPM | SYSTOLIC BLOOD PRESSURE: 136 MMHG

## 2021-11-17 DIAGNOSIS — M77.12 LATERAL EPICONDYLITIS OF LEFT ELBOW: ICD-10-CM

## 2021-11-17 DIAGNOSIS — R22.32 MASS OF HAND, LEFT: Primary | ICD-10-CM

## 2021-11-17 PROCEDURE — 99213 OFFICE O/P EST LOW 20 MIN: CPT | Performed by: STUDENT IN AN ORGANIZED HEALTH CARE EDUCATION/TRAINING PROGRAM

## 2021-11-17 NOTE — PROGRESS NOTES
[facility] [address] [place] [address] [facility]   Office [phone], Fax [phone]    Subjective:     Chief Complaint   Patient presents with    Ganglion Cyst     has a cyst on her left wrist. she has swelling in her left wrist as well. this has been going on for 2 weeks    Elbow Pain     she also has soreness in her left elbow and it feels very tight as well. this has been going on for 2 weeks     History provided by patient     HPI:  Candace Wagner is a 35 y.o. OTHER female with past medical history of Seasonal Allergies presents for   Chief Complaint   Patient presents with    Ganglion Cyst     has a cyst on her left wrist. she has swelling in her left wrist as well. this has been going on for 2 weeks    Elbow Pain     she also has soreness in her left elbow and it feels very tight as well. this has been going on for 2 weeks       Ms. Bryan Stephenson presents to clinic for a cyst on the dorsum surface of her left hand and left elbow pain. Cyst came up about two weeks ago. She feels like the cyst is getting bigger. The cyst does not cause her pain. She has never had this before. It moves when she flexes her fingers. 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 She says that when she is not working as much it improves, but she has been working more lately. She is not interested in any injections today.     Social History     Socioeconomic History    Marital status: SINGLE     Spouse name: Not on file    Number of children: Not on file    Years of education: Not on file    Highest education level: Not on file   Occupational History    Not on file   Tobacco Use    Smoking status: Never Smoker    Smokeless tobacco: Never Used   Substance and Sexual Activity    Alcohol use: Yes     Comment: social    Drug use: No    Sexual activity: Not on file   Other Topics Concern    Not on file   Social History Narrative    Not on file Social Determinants of Health     Financial Resource Strain:     Difficulty of Paying Living Expenses: Not on file   Food Insecurity:     Worried About Running Out of Food in the Last Year: Not on file    Tyra of Food in the Last Year: Not on file   Transportation Needs:     Lack of Transportation (Medical): Not on file    Lack of Transportation (Non-Medical): Not on file   Physical Activity:     Days of Exercise per Week: Not on file    Minutes of Exercise per Session: Not on file   Stress:     Feeling of Stress : Not on file   Social Connections:     Frequency of Communication with Friends and Family: Not on file    Frequency of Social Gatherings with Friends and Family: Not on file    Attends Voodoo Services: Not on file    Active Member of [address] [place] [address] or Organizations: Not on file    Attends Club or Organization Meetings: Not on file    Marital Status: Not on file   Intimate Partner Violence:     Fear of Current or Ex-Partner: Not on file    Emotionally Abused: Not on file    Physically Abused: Not on file    Sexually Abused: Not on file   Housing Stability:     Unable to Pay for Housing in the Last Year: Not on file    Number of [place] in the Last Year: Not on file    Unstable Housing in the Last Year: Not on file     Review of Systems   Constitutional: Negative for chills and fever. Respiratory: Negative for cough and shortness of breath. Cardiovascular: Negative for chest pain and palpitations. Musculoskeletal: Positive for joint pain (elbow pain). Negative for falls and myalgias. Lump on dorsum surface of hand   Neurological: Negative for dizziness, tingling, sensory change, focal weakness, weakness and headaches.      Objective:     Visit Vitals  BP (!) 136/95 (BP 1 Location: Left upper arm, BP Patient Position: Sitting, BP Cuff Size: Large adult)   Pulse 67   Temp 97.1 °F (36.2 °C) (Temporal)   Resp 18   Ht 5' 4.37\" (1.635 m)   Wt 178 lb 3.2 oz (80.8 kg)   SpO2 98%   BMI 30.24 kg/m²      Physical Exam  Constitutional:       Appearance: Normal appearance. Cardiovascular:      Rate and Rhythm: Normal rate and regular rhythm. Pulses: Normal pulses. Heart sounds: Normal heart sounds. Pulmonary:      Effort: Pulmonary effort is normal.      Breath sounds: Normal breath sounds. Abdominal:      General: Abdomen is flat. Bowel sounds are normal.      Palpations: Abdomen is soft. Musculoskeletal:         General: Normal range of motion. Comments: Mobile mass present on dorsum of left hand. Soft, no tenderness, no skin changes. Moves with flexion of hand and fingers. No sensory changes. Full ROM of elbows and hands bilaterally. Tenderness to palpation over lateral aspect of elbow. Finkelstein's negative, Tinel's sign negative. Speeds test negative. Yergason's test negative. Skin:     General: Skin is warm and dry. Neurological:      General: No focal deficit present. Mental Status: She is alert and oriented to person, place, and time. Assessment and orders:       ICD-10-CM ICD-9-CM    1. Mass of hand, left  R22.32 782.2    2. Lateral epicondylitis of left elbow  M77.12 726.32      1. Mass on Left Hand: present on dorsum of hand, soft and mobile, seems like it is over the tendon and moves with flexion. Likely due to overuse. No concerning symptoms like sensory or motor changes. - Recommended supportive care including ice and OTC anti-inflammatory medicaitons  - Discussed removal options, but patient is not interested currently    2. Left Lateral Epicondylitis: tenderness to lateral aspect of left elbow, no sensory or motor dysfunction, likely due to overuse at work since she works with her hands a lot.   - Discussed physical therapy, but patient deferred at this time  - Advised to limit activity if able to since this should help with the pain  - Discussed injections, but would not like at this time, will follow up if desires injection in the future     Labs, imaging and immunization ordered as above    Follow Up: as needed if symptoms persist or worsen    Pt was discussed with Dr. Jayjay Rodrigues (attending physician). I have reviewed patient medical and social history and medications. I have reviewed pertinent labs results and other data. I have discussed the diagnosis with the patient and the intended plan as seen in the above orders. The patient has received an after-visit summary and questions were answered concerning future plans. I have discussed medication side effects and warnings with the patient as well.     Praveen Monotya MD  Resident [address] [place] [address]  11/24/21

## 2021-11-17 NOTE — PROGRESS NOTES
Zeb Marie is a 35 y.o. female    Chief Complaint   Patient presents with    Ganglion Cyst     has a cyst on her left wrist. she has swelling in her left wrist as well. this has been going on for 2 weeks    Elbow Pain     she also has soreness in her left elbow and it feels very tight as well. this has been going on for 2 weeks       1. Have you been to the ER, urgent care clinic since your last visit? Hospitalized since your last visit? No    2. Have you seen or consulted any other health care providers outside of the [address] [place] [address] since your last visit? Include any pap smears or colon screening. No      Visit Vitals  BP (!) 136/95 (BP 1 Location: Left upper arm, BP Patient Position: Sitting, BP Cuff Size: Large adult)   Pulse 67   Temp 97.1 °F (36.2 °C) (Temporal)   Resp 18   Ht 5' 4.37\" (1.635 m)   Wt 178 lb 3.2 oz (80.8 kg)   SpO2 98%   BMI 30.24 kg/m²           Health Maintenance Due   Topic Date Due    Hepatitis C Screening  Never done    COVID-19 Vaccine (1) Never done    DTaP/Tdap/Td series (1 - Tdap) Never done    Lipid Screen  Never done    Flu Vaccine (1) Never done         Medication Reconciliation completed, changes noted.   Please  Update medication list.

## 2022-03-18 PROBLEM — N73.0 PID (ACUTE PELVIC INFLAMMATORY DISEASE): Status: ACTIVE | Noted: 2017-10-18

## 2023-05-23 RX ORDER — CETIRIZINE HYDROCHLORIDE 10 MG/1
10 TABLET ORAL DAILY
COMMUNITY
Start: 2019-03-25

## 2023-05-23 RX ORDER — FLUTICASONE PROPIONATE 50 MCG
2 SPRAY, SUSPENSION (ML) NASAL DAILY
COMMUNITY
Start: 2019-03-25

## 2023-05-23 RX ORDER — IBUPROFEN 600 MG/1
600 TABLET ORAL EVERY 6 HOURS PRN
COMMUNITY
Start: 2017-03-27

## 2023-06-22 ENCOUNTER — HOSPITAL ENCOUNTER (OUTPATIENT)
Facility: HOSPITAL | Age: 43
Setting detail: SPECIMEN
Discharge: HOME | End: 2023-06-25

## 2023-06-22 LAB
ALBUMIN SERPL-MCNC: 3.8 G/DL (ref 3.5–5)
ALBUMIN/GLOB SERPL: 1 (ref 1.1–2.2)
ALP SERPL-CCNC: 81 U/L (ref 45–117)
ALT SERPL-CCNC: 24 U/L (ref 12–78)
ANION GAP SERPL CALC-SCNC: 6 MMOL/L (ref 5–15)
AST SERPL-CCNC: 15 U/L (ref 15–37)
BILIRUB SERPL-MCNC: 0.4 MG/DL (ref 0.2–1)
BUN SERPL-MCNC: 11 MG/DL (ref 6–20)
BUN/CREAT SERPL: 23 (ref 12–20)
CALCIUM SERPL-MCNC: 9.5 MG/DL (ref 8.5–10.1)
CHLORIDE SERPL-SCNC: 105 MMOL/L (ref 97–108)
CHOLEST SERPL-MCNC: 289 MG/DL
CO2 SERPL-SCNC: 27 MMOL/L (ref 21–32)
CREAT SERPL-MCNC: 0.47 MG/DL (ref 0.55–1.02)
ERYTHROCYTE [DISTWIDTH] IN BLOOD BY AUTOMATED COUNT: 12.8 % (ref 11.5–14.5)
EST. AVERAGE GLUCOSE BLD GHB EST-MCNC: 111 MG/DL
GLOBULIN SER CALC-MCNC: 4 G/DL (ref 2–4)
GLUCOSE SERPL-MCNC: 94 MG/DL (ref 65–100)
HBA1C MFR BLD: 5.5 % (ref 4–5.6)
HCT VFR BLD AUTO: 41.4 % (ref 35–47)
HDLC SERPL-MCNC: 61 MG/DL
HDLC SERPL: 4.7 (ref 0–5)
HGB BLD-MCNC: 13 G/DL (ref 11.5–16)
LDLC SERPL CALC-MCNC: 182.6 MG/DL (ref 0–100)
MCH RBC QN AUTO: 29.7 PG (ref 26–34)
MCHC RBC AUTO-ENTMCNC: 31.4 G/DL (ref 30–36.5)
MCV RBC AUTO: 94.7 FL (ref 80–99)
NRBC # BLD: 0 K/UL (ref 0–0.01)
NRBC BLD-RTO: 0 PER 100 WBC
PLATELET # BLD AUTO: 379 K/UL (ref 150–400)
PMV BLD AUTO: 10.1 FL (ref 8.9–12.9)
POTASSIUM SERPL-SCNC: 4.4 MMOL/L (ref 3.5–5.1)
PROT SERPL-MCNC: 7.8 G/DL (ref 6.4–8.2)
RBC # BLD AUTO: 4.37 M/UL (ref 3.8–5.2)
SODIUM SERPL-SCNC: 138 MMOL/L (ref 136–145)
TRIGL SERPL-MCNC: 227 MG/DL
TSH SERPL DL<=0.05 MIU/L-ACNC: 1.08 UIU/ML (ref 0.36–3.74)
VLDLC SERPL CALC-MCNC: 45.4 MG/DL
WBC # BLD AUTO: 6.6 K/UL (ref 3.6–11)

## 2023-06-22 PROCEDURE — 85027 COMPLETE CBC AUTOMATED: CPT

## 2023-06-22 PROCEDURE — 84443 ASSAY THYROID STIM HORMONE: CPT

## 2023-06-22 PROCEDURE — 36415 COLL VENOUS BLD VENIPUNCTURE: CPT

## 2023-06-22 PROCEDURE — 83036 HEMOGLOBIN GLYCOSYLATED A1C: CPT

## 2023-06-22 PROCEDURE — 80053 COMPREHEN METABOLIC PANEL: CPT

## 2023-06-22 PROCEDURE — 80061 LIPID PANEL: CPT

## 2023-08-10 ENCOUNTER — HOSPITAL ENCOUNTER (OUTPATIENT)
Facility: HOSPITAL | Age: 43
Setting detail: SPECIMEN
Discharge: HOME | End: 2023-08-13

## 2023-08-10 PROCEDURE — 80061 LIPID PANEL: CPT

## 2023-08-10 PROCEDURE — 36415 COLL VENOUS BLD VENIPUNCTURE: CPT

## 2023-08-10 PROCEDURE — 80053 COMPREHEN METABOLIC PANEL: CPT

## 2023-08-11 LAB
ALBUMIN SERPL-MCNC: 4 G/DL (ref 3.5–5)
ALBUMIN/GLOB SERPL: 1.1 (ref 1.1–2.2)
ALP SERPL-CCNC: 87 U/L (ref 45–117)
ALT SERPL-CCNC: 38 U/L (ref 12–78)
ANION GAP SERPL CALC-SCNC: 5 MMOL/L (ref 5–15)
AST SERPL-CCNC: 22 U/L (ref 15–37)
BILIRUB SERPL-MCNC: 0.4 MG/DL (ref 0.2–1)
BUN SERPL-MCNC: 8 MG/DL (ref 6–20)
BUN/CREAT SERPL: 19 (ref 12–20)
CALCIUM SERPL-MCNC: 9.5 MG/DL (ref 8.5–10.1)
CHLORIDE SERPL-SCNC: 103 MMOL/L (ref 97–108)
CHOLEST SERPL-MCNC: 294 MG/DL
CO2 SERPL-SCNC: 29 MMOL/L (ref 21–32)
COMMENT:: NORMAL
CREAT SERPL-MCNC: 0.42 MG/DL (ref 0.55–1.02)
GLOBULIN SER CALC-MCNC: 3.6 G/DL (ref 2–4)
GLUCOSE SERPL-MCNC: 93 MG/DL (ref 65–100)
HDLC SERPL-MCNC: 52 MG/DL
HDLC SERPL: 5.7 (ref 0–5)
LDLC SERPL CALC-MCNC: 178 MG/DL (ref 0–100)
POTASSIUM SERPL-SCNC: 4.4 MMOL/L (ref 3.5–5.1)
PROT SERPL-MCNC: 7.6 G/DL (ref 6.4–8.2)
SODIUM SERPL-SCNC: 137 MMOL/L (ref 136–145)
SPECIMEN HOLD: NORMAL
TRIGL SERPL-MCNC: 320 MG/DL
VLDLC SERPL CALC-MCNC: 64 MG/DL

## 2023-09-28 ENCOUNTER — HOSPITAL ENCOUNTER (OUTPATIENT)
Facility: HOSPITAL | Age: 43
Setting detail: SPECIMEN
Discharge: HOME | End: 2023-10-01

## 2023-09-28 PROCEDURE — 80061 LIPID PANEL: CPT

## 2023-09-28 PROCEDURE — 36415 COLL VENOUS BLD VENIPUNCTURE: CPT

## 2023-09-28 PROCEDURE — 80053 COMPREHEN METABOLIC PANEL: CPT

## 2023-09-29 LAB
ALBUMIN SERPL-MCNC: 4 G/DL (ref 3.5–5)
ALBUMIN/GLOB SERPL: 1.1 (ref 1.1–2.2)
ALP SERPL-CCNC: 68 U/L (ref 45–117)
ALT SERPL-CCNC: 23 U/L (ref 12–78)
ANION GAP SERPL CALC-SCNC: 3 MMOL/L (ref 5–15)
AST SERPL-CCNC: 19 U/L (ref 15–37)
BILIRUB SERPL-MCNC: 0.5 MG/DL (ref 0.2–1)
BUN SERPL-MCNC: 11 MG/DL (ref 6–20)
BUN/CREAT SERPL: 24 (ref 12–20)
CALCIUM SERPL-MCNC: 9.1 MG/DL (ref 8.5–10.1)
CHLORIDE SERPL-SCNC: 108 MMOL/L (ref 97–108)
CHOLEST SERPL-MCNC: 163 MG/DL
CO2 SERPL-SCNC: 28 MMOL/L (ref 21–32)
COMMENT:: NORMAL
CREAT SERPL-MCNC: 0.45 MG/DL (ref 0.55–1.02)
GLOBULIN SER CALC-MCNC: 3.6 G/DL (ref 2–4)
GLUCOSE SERPL-MCNC: 96 MG/DL (ref 65–100)
HDLC SERPL-MCNC: 61 MG/DL
HDLC SERPL: 2.7 (ref 0–5)
LDLC SERPL CALC-MCNC: 82.2 MG/DL (ref 0–100)
POTASSIUM SERPL-SCNC: 4 MMOL/L (ref 3.5–5.1)
PROT SERPL-MCNC: 7.6 G/DL (ref 6.4–8.2)
SODIUM SERPL-SCNC: 139 MMOL/L (ref 136–145)
SPECIMEN HOLD: NORMAL
TRIGL SERPL-MCNC: 99 MG/DL
VLDLC SERPL CALC-MCNC: 19.8 MG/DL

## 2023-10-19 ENCOUNTER — HOSPITAL ENCOUNTER (OUTPATIENT)
Facility: HOSPITAL | Age: 43
Setting detail: SPECIMEN
Discharge: HOME | End: 2023-10-22

## 2023-10-19 LAB
APPEARANCE UR: CLEAR
BILIRUB UR QL: NEGATIVE
COLOR UR: NORMAL
GLUCOSE UR STRIP.AUTO-MCNC: NEGATIVE MG/DL
HGB UR QL STRIP: NEGATIVE
KETONES UR QL STRIP.AUTO: NEGATIVE MG/DL
LEUKOCYTE ESTERASE UR QL STRIP.AUTO: NEGATIVE
NITRITE UR QL STRIP.AUTO: NEGATIVE
PH UR STRIP: 6 (ref 5–8)
PROT UR STRIP-MCNC: NEGATIVE MG/DL
SP GR UR REFRACTOMETRY: 1.01 (ref 1–1.03)
UROBILINOGEN UR QL STRIP.AUTO: 0.2 EU/DL (ref 0.2–1)

## 2023-10-19 PROCEDURE — 81002 URINALYSIS NONAUTO W/O SCOPE: CPT

## 2023-10-20 ENCOUNTER — TRANSCRIBE ORDERS (OUTPATIENT)
Facility: HOSPITAL | Age: 43
End: 2023-10-20

## 2023-10-20 DIAGNOSIS — N64.4 BREAST PAIN IN FEMALE: Primary | ICD-10-CM

## 2023-10-30 ENCOUNTER — HOSPITAL ENCOUNTER (OUTPATIENT)
Facility: HOSPITAL | Age: 43
Discharge: HOME | End: 2023-11-02

## 2023-10-30 VITALS — HEIGHT: 64 IN | WEIGHT: 168 LBS | BODY MASS INDEX: 28.68 KG/M2

## 2023-10-30 DIAGNOSIS — N64.4 BREAST PAIN IN FEMALE: ICD-10-CM

## 2023-10-30 PROCEDURE — G0279 TOMOSYNTHESIS, MAMMO: HCPCS

## 2023-11-13 ENCOUNTER — TELEPHONE (OUTPATIENT)
Age: 43
End: 2023-11-13

## 2023-11-13 NOTE — TELEPHONE ENCOUNTER
Called from a referral and was unable to leave a a message for patient to call back to make an appointment.